# Patient Record
Sex: MALE | Race: BLACK OR AFRICAN AMERICAN | URBAN - METROPOLITAN AREA
[De-identification: names, ages, dates, MRNs, and addresses within clinical notes are randomized per-mention and may not be internally consistent; named-entity substitution may affect disease eponyms.]

---

## 2017-02-10 DIAGNOSIS — H69.93 DYSFUNCTION OF EUSTACHIAN TUBE, BILATERAL: Primary | ICD-10-CM

## 2017-02-10 RX ORDER — LORATADINE 10 MG/1
10 TABLET ORAL DAILY
Qty: 30 TABLET | Refills: 4 | Status: SHIPPED | OUTPATIENT
Start: 2017-02-10 | End: 2017-06-10

## 2017-02-16 DIAGNOSIS — J31.0 CHRONIC RHINITIS: ICD-10-CM

## 2017-02-16 RX ORDER — FLUTICASONE PROPIONATE 50 MCG
1-2 SPRAY, SUSPENSION (ML) NASAL DAILY
Qty: 16 G | Refills: 1 | Status: SHIPPED | OUTPATIENT
Start: 2017-02-16

## 2017-02-16 NOTE — TELEPHONE ENCOUNTER
Reason for Call:  Medication or medication refill:    Do you use a [facility]?  Name of the pharmacy and phone number for the current request:  [facility] [address] [place] - [phone] Fax [phone]    Name of the medication requested: Nasal spray    Other request: per pharmacist.    Can we leave a detailed message on this number? YES    Phone number patient can be reached at: Other phone number:  [phone]    Best Time: asap    Call taken on 2/16/2017 at 9:02 AM by Luanne Sharp

## 2017-02-16 NOTE — TELEPHONE ENCOUNTER
fluticasone (FLONASE) 50 MCG/ACT nasal spray      Last Written Prescription Date: 6/01/2016  Last Fill Quantity: 16g,  # refills: 1   Last Office Visit with [facility], [facility] or [facility] prescribing provider: 6/01/2016

## 2017-03-03 ENCOUNTER — MEDICAL CORRESPONDENCE (OUTPATIENT)
Dept: HEALTH INFORMATION MANAGEMENT | Facility: CLINIC | Age: 27
End: 2017-03-03

## 2017-05-18 ENCOUNTER — OFFICE VISIT (OUTPATIENT)
Dept: INTERNAL MEDICINE | Facility: CLINIC | Age: 27
End: 2017-05-18
Payer: MEDICARE

## 2017-05-18 VITALS
BODY MASS INDEX: 32.09 KG/M2 | WEIGHT: 192.6 LBS | OXYGEN SATURATION: 98 % | SYSTOLIC BLOOD PRESSURE: 128 MMHG | HEART RATE: 80 BPM | DIASTOLIC BLOOD PRESSURE: 78 MMHG | HEIGHT: 65 IN | TEMPERATURE: 98.4 F

## 2017-05-18 DIAGNOSIS — Z00.00 ENCOUNTER FOR ROUTINE ADULT HEALTH EXAMINATION WITHOUT ABNORMAL FINDINGS: ICD-10-CM

## 2017-05-18 DIAGNOSIS — Z13.6 CARDIOVASCULAR SCREENING; LDL GOAL LESS THAN 160: Primary | ICD-10-CM

## 2017-05-18 DIAGNOSIS — R73.9 ELEVATED BLOOD SUGAR: ICD-10-CM

## 2017-05-18 DIAGNOSIS — F33.0 MAJOR DEPRESSIVE DISORDER, RECURRENT EPISODE, MILD (H): ICD-10-CM

## 2017-05-18 DIAGNOSIS — F12.20 CANNABIS DEPENDENCE (H): ICD-10-CM

## 2017-05-18 PROCEDURE — 99395 PREV VISIT EST AGE 18-39: CPT | Performed by: INTERNAL MEDICINE

## 2017-05-18 NOTE — MR AVS SNAPSHOT
After Visit Summary   5/18/2017    Vitor Osman    MRN: [id number]           Patient Information     Date Of Birth          1990        Visit Information        Provider Department      5/18/2017 3:30 PM Lee Collins MD [facility]        Today's Diagnoses     CARDIOVASCULAR SCREENING; LDL GOAL LESS THAN 160    -  1    Encounter for routine adult health examination without abnormal findings        Elevated blood sugar        Major depressive disorder, recurrent episode, mild (H)        Cannabis dependence (H)          Care Instructions      Preventive Health Recommendations  Male Ages 26 - 39    Yearly exam:             See your health care provider every year in order to  o   Review health changes.   o   Discuss preventive care.    o   Review your medicines if your doctor has prescribed any.    You should be tested each year for STDs (sexually transmitted diseases), if you re at risk.     After age 35, talk to your provider about cholesterol testing. If you are at risk for heart disease, have your cholesterol tested at least every 5 years.     If you are at risk for diabetes, you should have a diabetes test (fasting glucose).  Shots: Get a flu shot each year. Get a tetanus shot every 10 years.     Nutrition:    Eat at least 5 servings of fruits and vegetables daily.     Eat whole-grain bread, whole-wheat pasta and brown rice instead of white grains and rice.     Talk to your provider about Calcium and Vitamin D.     Lifestyle    Exercise for at least 150 minutes a week (30 minutes a day, 5 days a week). This will help you control your weight and prevent disease.     Limit alcohol to one drink per day.     No smoking.     Wear sunscreen to prevent skin cancer.     See your dentist every six months for an exam and cleaning.             Follow-ups after your visit        Who to contact     If you have questions or need follow up information about today's clinic  "visit or your schedule please contact [facility] directly at [phone].  Normal or non-critical lab and imaging results will be communicated to you by MyChart, letter or phone within 4 business days after the clinic has received the results. If you do not hear from us within 7 days, please contact the clinic through MyChart or phone. If you have a critical or abnormal lab result, we will notify you by phone as soon as possible.  Submit refill requests through [employer] or call your pharmacy and they will forward the refill request to us. Please allow 3 business days for your refill to be completed.          Additional Information About Your Visit        MyChart Information     [employer] lets you send messages to your doctor, view your test results, renew your prescriptions, schedule appointments and more. To sign up, go to [email or web address] . Click on \"Log in\" on the left side of the screen, which will take you to the Welcome page. Then click on \"Sign up Now\" on the right side of the page.     You will be asked to enter the access code listed below, as well as some personal information. Please follow the directions to create your username and password.     Your access code is: [email or web address]  Expires: 2017 11:18 PM     Your access code will  in 90 days. If you need help or a new code, please call your [facility] clinic or [phone].        Care EveryWhere ID     This is your Care EveryWhere ID. This could be used by other organizations to access your [facility] medical records  [id number]        Your Vitals Were     Pulse Temperature Height Pulse Oximetry BMI (Body Mass Index)       80 98.4  F (36.9  C) (Oral) 5' 5\" (1.651 m) 98% 32.05 kg/m2        Blood Pressure from Last 3 Encounters:   17 122/78   17 142/89   17 128/78    Weight from Last 3 Encounters:   17 189 lb (85.7 kg)   17 192 lb (87.1 kg)   17 192 lb 9.6 oz (87.4 kg)               " Primary Care Provider Office Phone # Fax #    Lee Collins [phone]       [facility] [address] [facility] [address]        Thank you!     Thank you for choosing [facility]  for your care. Our goal is always to provide you with excellent care. Hearing back from our patients is one way we can continue to improve our services. Please take a few minutes to complete the written survey that you may receive in the mail after your visit with us. Thank you!             Your Updated Medication List - Protect others around you: Learn how to safely use, store and throw away your medicines at www.disposemymeds.org.          This list is accurate as of: 5/18/17 11:59 PM.  Always use your most recent med list.                   Brand Name Dispense Instructions for use    amphetamine-dextroamphetamine 10 MG per tablet    ADDERALL    60 tablet    Take 1 tablet (10 mg) by mouth 2 times daily DO NOT FILL BEFORE OCT 14       fluticasone 50 MCG/ACT spray    FLONASE    16 g    Spray 1-2 sprays into both nostrils daily       sertraline 50 MG tablet    ZOLOFT    90 tablet    Take 1 tablet (50 mg) by mouth daily       traZODone 50 MG tablet    DESYREL    30 tablet    Take 1 tablet (50 mg) by mouth nightly as needed for sleep

## 2017-05-18 NOTE — PROGRESS NOTES
SUBJECTIVE:     CC: Vitor Osman is an 26 year old male who presents for preventative health visit.     Healthy Habits:    Do you get at least three servings of calcium containing foods daily (dairy, green leafy vegetables, etc.)? yes    Amount of exercise or daily activities, outside of work: 7 day(s) per week    Problems taking medications regularly No    Medication side effects: No    Have you had an eye exam in the past two years? no    Do you see a dentist twice per year? no    Do you have sleep apnea, excessive snoring or daytime drowsiness?no            Today's PHQ-2 Score:   PHQ-2 ( 1999 Pf[employer]) 5/18/2017 6/1/2016   Q1: Little interest or pleasure in doing things 1 3   Q2: Feeling down, depressed or hopeless 1 3   PHQ-2 Score 2 6       Abuse: Current or Past(Physical, Sexual or Emotional)- No  Do you feel safe in your environment - Yes    Social History   Substance Use Topics     Smoking status: Current Every Day Smoker     Packs/day: 1.00     Last attempt to quit: 8/3/2013     Smokeless tobacco: Never Used     Alcohol use No     The patient does not drink >3 drinks per day nor >7 drinks per week.    Last PSA: No results found for: PSA    Recent Labs   Lab Test  05/03/14   1038   CHOL  182   HDL  34*   LDL  118   TRIG  151*   CHOLHDLRATIO  5.4*       Reviewed orders with patient. Reviewed health maintenance and updated orders accordingly - Yes    Reviewed and updated as needed this visit by clinical staff  Tobacco  Allergies  Med Hx  Surg Hx  Fam Hx  Soc Hx        Reviewed and updated as needed this visit by Provider            ROS:  C: NEGATIVE for fever, chills, change in weight  I: NEGATIVE for worrisome rashes, moles or lesions  E: NEGATIVE for vision changes or irritation  ENT: NEGATIVE for ear, mouth and throat problems  R: NEGATIVE for significant cough or SOB  CV: NEGATIVE for chest pain, palpitations or peripheral edema  GI: NEGATIVE for nausea, abdominal pain, heartburn, or change in  "bowel habits   male: negative for dysuria, hematuria, decreased urinary stream, erectile dysfunction, urethral discharge  M: NEGATIVE for significant arthralgias or myalgia  N: NEGATIVE for weakness, dizziness or paresthesias  P: NEGATIVE for changes in mood or affect      OBJECTIVE:     /78 (BP Location: Left arm, Patient Position: Chair, Cuff Size: Adult Regular)  Pulse 80  Temp 98.4  F (36.9  C) (Oral)  Ht 5' 5\" (1.651 m)  Wt 192 lb 9.6 oz (87.4 kg)  SpO2 98%  BMI 32.05 kg/m2  EXAM:  GENERAL: healthy, alert and no distress  NECK: no adenopathy, no asymmetry, masses, or scars and thyroid normal to palpation  RESP: lungs clear to auscultation - no rales, rhonchi or wheezes  CV: regular rate and rhythm, normal S1 S2, no S3 or S4, no murmur, click or rub, no peripheral edema and peripheral pulses strong  ABDOMEN: soft, nontender, no hepatosplenomegaly, no masses and bowel sounds normal  MS: no gross musculoskeletal defects noted, no edema    ASSESSMENT/PLAN:     1. Encounter for routine adult health examination without abnormal findings      2. CARDIOVASCULAR SCREENING; LDL GOAL LESS THAN 160    - Lipid panel reflex to direct LDL; Future    3. Elevated blood sugar    - Basic metabolic panel; Future  - Hemoglobin A1c; Future    4. Major depressive disorder, recurrent episode, mild (H)      5. Cannabis dependence (H)       COUNSELING:  Reviewed preventive health counseling, as reflected in patient instructions         reports that he has been smoking.  He has been smoking about 1.00 pack per day. He has never used smokeless tobacco.    Estimated body mass index is 32.05 kg/(m^2) as calculated from the following:    Height as of this encounter: 5' 5\" (1.651 m).    Weight as of this encounter: 192 lb 9.6 oz (87.4 kg).   Weight management plan: Discussed healthy diet and exercise guidelines and patient will follow up in 12 months in clinic to re-evaluate.    Counseling Resources:  ATP IV Guidelines  Pooled " Cohorts Equation Calculator  FRAX Risk Assessment  ICSI Preventive Guidelines  Dietary Guidelines for Americans, 2010  USDA's MyPlate  ASA Prophylaxis  Lung CA Screening    Lee Collins MD  [facility]

## 2017-05-19 ASSESSMENT — PATIENT HEALTH QUESTIONNAIRE - PHQ9: SUM OF ALL RESPONSES TO PHQ QUESTIONS 1-9: 10

## 2017-05-30 ENCOUNTER — APPOINTMENT (OUTPATIENT)
Dept: GENERAL RADIOLOGY | Facility: CLINIC | Age: 27
End: 2017-05-30
Attending: EMERGENCY MEDICINE
Payer: MEDICARE

## 2017-05-30 ENCOUNTER — HOSPITAL ENCOUNTER (EMERGENCY)
Facility: CLINIC | Age: 27
Discharge: HOME | End: 2017-05-30
Attending: EMERGENCY MEDICINE | Admitting: EMERGENCY MEDICINE
Payer: MEDICARE

## 2017-05-30 VITALS
SYSTOLIC BLOOD PRESSURE: 142 MMHG | TEMPERATURE: 98.2 F | BODY MASS INDEX: 31.99 KG/M2 | HEART RATE: 86 BPM | OXYGEN SATURATION: 97 % | RESPIRATION RATE: 16 BRPM | WEIGHT: 192 LBS | DIASTOLIC BLOOD PRESSURE: 89 MMHG | HEIGHT: 65 IN

## 2017-05-30 DIAGNOSIS — R73.9 HYPERGLYCEMIA: ICD-10-CM
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
## 2017-05-30 DIAGNOSIS — R07.9 ACUTE CHEST PAIN: ICD-10-CM

## 2017-05-30 LAB
ANION GAP SERPL CALCULATED.3IONS-SCNC: 8 MMOL/L (ref 3–14)
BASOPHILS # BLD AUTO: 0 10E9/L (ref 0–0.2)
BASOPHILS NFR BLD AUTO: 0.4 %
BUN SERPL-MCNC: 14 MG/DL (ref 7–30)
CALCIUM SERPL-MCNC: 9 MG/DL (ref 8.5–10.1)
CHLORIDE SERPL-SCNC: 101 MMOL/L (ref 94–109)
CO2 SERPL-SCNC: 26 MMOL/L (ref 20–32)
CREAT SERPL-MCNC: 0.76 MG/DL (ref 0.66–1.25)
DIFFERENTIAL METHOD BLD: ABNORMAL
EOSINOPHIL # BLD AUTO: 0.1 10E9/L (ref 0–0.7)
EOSINOPHIL NFR BLD AUTO: 0.8 %
ERYTHROCYTE [DISTWIDTH] IN BLOOD BY AUTOMATED COUNT: 13 % (ref 10–15)
GFR SERPL CREATININE-BSD FRML MDRD: ABNORMAL ML/MIN/1.7M2
GLUCOSE SERPL-MCNC: 267 MG/DL (ref 70–99)
HCT VFR BLD AUTO: 39.9 % (ref 40–53)
HGB BLD-MCNC: 14.2 G/DL (ref 13.3–17.7)
IMM GRANULOCYTES # BLD: 0 10E9/L (ref 0–0.4)
IMM GRANULOCYTES NFR BLD: 0.4 %
LYMPHOCYTES # BLD AUTO: 4.5 10E9/L (ref 0.8–5.3)
LYMPHOCYTES NFR BLD AUTO: 40.3 %
MCH RBC QN AUTO: 29.3 PG (ref 26.5–33)
MCHC RBC AUTO-ENTMCNC: 35.6 G/DL (ref 31.5–36.5)
MCV RBC AUTO: 82 FL (ref 78–100)
MONOCYTES # BLD AUTO: 0.7 10E9/L (ref 0–1.3)
MONOCYTES NFR BLD AUTO: 6.5 %
NEUTROPHILS # BLD AUTO: 5.7 10E9/L (ref 1.6–8.3)
NEUTROPHILS NFR BLD AUTO: 51.6 %
NRBC # BLD AUTO: 0 10*3/UL
NRBC BLD AUTO-RTO: 0 /100
PLATELET # BLD AUTO: 301 10E9/L (ref 150–450)
POTASSIUM SERPL-SCNC: 4.4 MMOL/L (ref 3.4–5.3)
RBC # BLD AUTO: 4.85 10E12/L (ref 4.4–5.9)
SODIUM SERPL-SCNC: 135 MMOL/L (ref 133–144)
TROPONIN I SERPL-MCNC: NORMAL UG/L (ref 0–0.04)
WBC # BLD AUTO: 11.1 10E9/L (ref 4–11)

## 2017-05-30 PROCEDURE — 84484 ASSAY OF TROPONIN QUANT: CPT | Performed by: EMERGENCY MEDICINE

## 2017-05-30 PROCEDURE — 80048 BASIC METABOLIC PNL TOTAL CA: CPT | Performed by: EMERGENCY MEDICINE

## 2017-05-30 PROCEDURE — 73030 X-RAY EXAM OF SHOULDER: CPT | Mod: LT

## 2017-05-30 PROCEDURE — 85025 COMPLETE CBC W/AUTO DIFF WBC: CPT | Performed by: EMERGENCY MEDICINE

## 2017-05-30 PROCEDURE — 99285 EMERGENCY DEPT VISIT HI MDM: CPT | Mod: 25

## 2017-05-30 PROCEDURE — 71020 XR CHEST 2 VW: CPT

## 2017-05-30 PROCEDURE — 93005 ELECTROCARDIOGRAM TRACING: CPT

## 2017-05-30 ASSESSMENT — ENCOUNTER SYMPTOMS
NAUSEA: 0
SHORTNESS OF BREATH: 1
VOMITING: 0
DIAPHORESIS: 0
BACK PAIN: 1

## 2017-05-30 NOTE — ED AVS SNAPSHOT
Emergency Department    [address] [facility] [address]    Phone:  [phone]    Fax:  [phone]                                       Vitor Osman   MRN: [id number]    Department:   Emergency Department   Date of Visit:  5/30/2017           Patient Information     Date Of Birth          1990        Your diagnoses for this visit were:     Acute chest pain     Fall, initial encounter     Hyperglycemia        You were seen by Jewel Clifton MD.      Follow-up Information     Follow up with Lee Collins MD. Call in 1 day.    Specialty:  Internal Medicine    Contact information:    [facility]  [address] [facility] [address] [phone]          Follow up with  Emergency Department.    Specialty:  EMERGENCY MEDICINE    Why:  As needed, If symptoms worsen    Contact information:    [address] [facility] [id number] [phone]      Discharge References/Attachments     CHEST PAIN, UNCERTAIN CAUSE (ENGLISH)    UPPER EXTREMITY CONTUSION (ENGLISH)      Future Appointments        Provider Department Dept Phone Center    6/8/2017 4:15 PM Lee Collins MD [facility] [phone]       24 Hour Appointment Hotline       To make an appointment at any [facility], call [phone] ([phone]). If you don't have a family doctor or clinic, we will help you find one. [facility] are conveniently located to serve the needs of you and your family.             Review of your medicines      Our records show that you are taking the medicines listed below. If these are incorrect, please call your family doctor or clinic.        Dose / Directions Last dose taken    amphetamine-dextroamphetamine 10 MG per tablet   Commonly known as:  ADDERALL   Dose:  10 mg   Quantity:  60 tablet        Take 1 tablet (10 mg) by mouth 2 times daily DO NOT FILL BEFORE OCT 14   Refills:  0        fluticasone 50  MCG/ACT spray   Commonly known as:  FLONASE   Dose:  1-2 spray   Quantity:  16 g        Spray 1-2 sprays into both nostrils daily   Refills:  1        loratadine 10 MG tablet   Commonly known as:  CLARITIN   Dose:  10 mg   Quantity:  30 tablet        Take 1 tablet (10 mg) by mouth daily   Refills:  4        sertraline 50 MG tablet   Commonly known as:  ZOLOFT   Dose:  50 mg   Quantity:  90 tablet        Take 1 tablet (50 mg) by mouth daily   Refills:  3        traZODone 50 MG tablet   Commonly known as:  DESYREL   Dose:  50 mg   Quantity:  30 tablet        Take 1 tablet (50 mg) by mouth nightly as needed for sleep   Refills:  1                Procedures and tests performed during your visit     Basic metabolic panel    CBC with platelets differential    EKG 12 lead    Shoulder XR, G/E 3 views, left    Troponin I    XR Chest 2 Views      Orders Needing Specimen Collection     None      Pending Results     No orders found from 5/28/2017 to 5/31/2017.            Pending Culture Results     No orders found from 5/28/2017 to 5/31/2017.            Pending Results Instructions     If you had any lab results that were not finalized at the time of your Discharge, you can call the ED Lab Result RN at [phone]. You will be contacted by this team for any positive Lab results or changes in treatment. The nurses are available 7 days a week from 10A to 6:30P.  You can leave a message 24 hours per day and they will return your call.        Test Results From Your Hospital Stay        5/30/2017 10:26 PM      Component Results     Component Value Ref Range & Units Status    WBC 11.1 (H) 4.0 - 11.0 10e9/L Final    RBC Count 4.85 4.4 - 5.9 10e12/L Final    Hemoglobin 14.2 13.3 - 17.7 g/dL Final    Hematocrit 39.9 (L) 40.0 - 53.0 % Final    MCV 82 78 - 100 fl Final    MCH 29.3 26.5 - 33.0 pg Final    MCHC 35.6 31.5 - 36.5 g/dL Final    RDW 13.0 10.0 - 15.0 % Final    Platelet Count 301 150 - 450 10e9/L Final    Diff Method Automated  Method  Final    % Neutrophils 51.6 % Final    % Lymphocytes 40.3 % Final    % Monocytes 6.5 % Final    % Eosinophils 0.8 % Final    % Basophils 0.4 % Final    % Immature Granulocytes 0.4 % Final    Nucleated RBCs 0 0 /100 Final    Absolute Neutrophil 5.7 1.6 - 8.3 10e9/L Final    Absolute Lymphocytes 4.5 0.8 - 5.3 10e9/L Final    Absolute Monocytes 0.7 0.0 - 1.3 10e9/L Final    Absolute Eosinophils 0.1 0.0 - 0.7 10e9/L Final    Absolute Basophils 0.0 0.0 - 0.2 10e9/L Final    Abs Immature Granulocytes 0.0 0 - 0.4 10e9/L Final    Absolute Nucleated RBC 0.0  Final         5/30/2017 10:55 PM      Component Results     Component Value Ref Range & Units Status    Sodium 135 133 - 144 mmol/L Final    Potassium 4.4 3.4 - 5.3 mmol/L Final    Chloride 101 94 - 109 mmol/L Final    Carbon Dioxide 26 20 - 32 mmol/L Final    Anion Gap 8 3 - 14 mmol/L Final    Glucose 267 (H) 70 - 99 mg/dL Final    Urea Nitrogen 14 7 - 30 mg/dL Final    Creatinine 0.76 0.66 - 1.25 mg/dL Final    GFR Estimate >90  Non  GFR Calc   >60 mL/min/1.7m2 Final    GFR Estimate If Black >90   GFR Calc   >60 mL/min/1.7m2 Final    Calcium 9.0 8.5 - 10.1 mg/dL Final         5/30/2017 10:55 PM      Component Results     Component Value Ref Range & Units Status    Troponin I ES  0.000 - 0.045 ug/L Final    <0.015  The 99th percentile for upper reference range is 0.045 ug/L.  Troponin values in   the range of 0.045 - 0.120 ug/L may be associated with risks of adverse   clinical events.           5/30/2017 10:35 PM      Narrative     CHEST TWO VIEWS   5/30/2017 10:23 PM    HISTORY: Chest pain.    COMPARISON: 12/11/2014    FINDINGS: The heart size is normal. No mediastinal pathology is seen.  The lungs are clear. The pulmonary vasculature is normal. No  pneumothorax or pleural effusion is seen. No fracture or other chest  wall pathology is seen. I see no definite change since the previous  examination.        Impression      IMPRESSION: Unremarkable chest.    SUSAN YE MD         5/30/2017 10:28 PM      Narrative     LEFT SHOULDER 3 VIEWS  5/30/2017 10:24 PM    HISTORY:  Pain after injury yesterday.    COMPARISON:  None.    FINDINGS:  No fracture or osseous lesion is seen. The joint spaces are  well preserved. No adjacent soft tissue pathology is seen.        Impression     IMPRESSION:  Unremarkable examination.    SUSAN YE MD                Clinical Quality Measure: Blood Pressure Screening     Your blood pressure was checked while you were in the emergency department today. The last reading we obtained was  BP: 142/89 . Please read the guidelines below about what these numbers mean and what you should do about them.  If your systolic blood pressure (the top number) is less than 120 and your diastolic blood pressure (the bottom number) is less than 80, then your blood pressure is normal. There is nothing more that you need to do about it.  If your systolic blood pressure (the top number) is 120-139 or your diastolic blood pressure (the bottom number) is 80-89, your blood pressure may be higher than it should be. You should have your blood pressure rechecked within a year by a primary care provider.  If your systolic blood pressure (the top number) is 140 or greater or your diastolic blood pressure (the bottom number) is 90 or greater, you may have high blood pressure. High blood pressure is treatable, but if left untreated over time it can put you at risk for heart attack, stroke, or kidney failure. You should have your blood pressure rechecked by a primary care provider within the next 4 weeks.  If your provider in the emergency department today gave you specific instructions to follow-up with your doctor or provider even sooner than that, you should follow that instruction and not wait for up to 4 weeks for your follow-up visit.        Thank you for choosing [facility]       Thank you for choosing [facility] for your  "care. Our goal is always to provide you with excellent care. Hearing back from our patients is one way we can continue to improve our services. Please take a few minutes to complete the written survey that you may receive in the mail after you visit with us. Thank you!        [employer] Information     [employer] lets you send messages to your doctor, view your test results, renew your prescriptions, schedule appointments and more. To sign up, go to [email or web address] . Click on \"Log in\" on the left side of the screen, which will take you to the Welcome page. Then click on \"Sign up Now\" on the right side of the page.     You will be asked to enter the access code listed below, as well as some personal information. Please follow the directions to create your username and password.     Your access code is: [email or web address]  Expires: 2017 11:18 PM     Your access code will  in 90 days. If you need help or a new code, please call your [facility] clinic or [phone].        Care EveryWhere ID     This is your Care EveryWhere ID. This could be used by other organizations to access your [facility] medical records  [id number]        After Visit Summary       This is your record. Keep this with you and show to your community pharmacist(s) and doctor(s) at your next visit.                  "

## 2017-05-30 NOTE — ED AVS SNAPSHOT
Emergency Department    [address] [place] [address]    Phone:  [phone]    Fax:  [phone]                                       Vitor Osman   MRN: [id number]    Department:   Emergency Department   Date of Visit:  5/30/2017           After Visit Summary Signature Page     I have received my discharge instructions, and my questions have been answered. I have discussed any challenges I see with this plan with the nurse or doctor.    ..........................................................................................................................................  Patient/Patient Representative Signature      ..........................................................................................................................................  Patient Representative Print Name and Relationship to Patient    ..................................................               ................................................  Date                                            Time    ..........................................................................................................................................  Reviewed by Signature/Title    ...................................................              ..............................................  Date                                                            Time

## 2017-05-31 NOTE — ED PROVIDER NOTES
"  History     Chief Complaint:  Chest Pain    HPI   History is limited due to being a poor historian, and supplemented by group home staff member at bedside.    Vitor Osman is a 26 year old male with a history of low back pain who presents with chest pain. The patient reports that he had \"a little\" left chest pain for multiple days before worsening of left shoulder and left sided chest pain after tripping and falling yesterday while carrying a big box. Since that time, he states that he has taken \"a lot\" of ibuprofen. However, because his pain continued and he developed mild shortness of breath, he presented to the ED for evaluation. While in the ED, he denies any other injury in the fall. He states that he last smoked marijuana \"a long time ago.\" He also denies nausea, vomiting, dizziness, or diaphoresis.  No prior heart of lung problems.      Allergies:  NKDA    Medications:    Flonase  Claritin   Adderall  Zoloft  Desyrel      Past Medical History:    ADHD  Cannabis dependence   Fetal alcohol syndrome  Mild major depression   Mild mental retardation   Bilateral low back pain without sciatica     Past Surgical History:    Unspecified surgery \"in the groin\" as a child    Family History:  History reviewed.  No significant family history.     Social History:  Relationship status: Single  The patient currently smokes 1 pack/day.   The patient does not drink alcohol.   The patient presents with a male , a member of his group home staff.     Review of Systems   Constitutional: Negative for diaphoresis.   Respiratory: Positive for shortness of breath.    Cardiovascular: Positive for chest pain.   Gastrointestinal: Negative for nausea and vomiting.   Musculoskeletal: Positive for back pain.        Positive for shoulder pain   All other systems reviewed and are negative.      Physical Exam   First Vitals:  BP: 142/89  Pulse: 86  Temp: 98.2  F (36.8  C)  Resp: 16  Height: 165.1 cm (5' 5\")  Weight: 87.1 kg (192 " lb)  SpO2: 97 %  RA    Physical Exam  General: nontoxic appearing male semi-recumbent, male group home staff at bedside  HENT: mucous membranes moist  CV: regular rate, regular rhythm, no lower extremity edema, no JVD, palpable symmetric radial pulses  Resp: clear throughout, normal effort, no crackles or wheezing  GI: abdomen soft and nontender, no guarding, negative Sosa's sign  MSK: mild L anterior tenderness to chest, also somewhat tender to L anterior shoulder with good ROM and no palpable acute deformity, remainder of chest wall, spine, and all extremities without acute tenderness  Skin: appropriately warm and dry, no erythema or vesicles to chest wall  Neuro: alert, clear speech, oriented   Psych: slightly flat affect, cooperative      Emergency Department Course     ECG @ 2140  Indication: Chest pain  Rate 84 bpm.   MD interval 146 ms.   QRS duration 84 ms.   QT/QTc 366/432 ms.   P-R-T axes -1.  Notes: Normal sinus rhythm. Possible left atrial enlargement. Possible septal infarct, age undetermined.    Time read 2147    Imaging:  Radiographic findings were communicated with the patient who voiced understanding of the findings.    Left Shoulder XR per radiology:   IMPRESSION:  Unremarkable examination.    Chest XR per radiology:   IMPRESSION: Unremarkable chest.      Laboratory:  CBC: WBC 11.1 (H) HGB 14.2 (WNL)  (WNL) HCT 39.9 (L)  BMP: Cr 0.76 (WNL) Glucose 267 (H)  Rest WNL  2200: Troponin I: <0.015 (WNL)    ED Course:  The patient arrived in triage where his vitals were measured and recorded. The patient was then escorted back to the emergency department.  Nursing notes and past medical history reviewed.   I performed a physical examination of the patient as documented above.  I explained the plan with the patient who consents to this.   Blood was drawn and sent to the laboratory for testing, see above results.   An ECG was obtained.   The patient underwent the above imaging studies.    The  patient was placed on continuous cardiac and pulse ox monitoring.    2247: Patient was rechecked.    I personally reviewed the laboratory and imaging results with the Patient and answered all related questions prior to discharge.   Findings and plan explained to the Patient and caregiver. Patient discharged home with instructions regarding supportive care, medications, and reasons to return. The importance of close follow-up was reviewed.     Impression & Plan      Medical Decision Making:  This 26 year old presents with concern for chest pain as well as left shoulder pain. He is a poor historian.  He states that he had a little chest pain that started well before his mechanical fall yesterday that caused worsening of his left chest pain and left shoulder pain here. His exam is benign. He is negative by PERC criteria, making PE extremely unlikely. There is no evidence of pneumothorax, significant rib fracture, or acute bony pathology to the left shoulder. Also considered medical causes such as arrhythmia, PNA, or ACS.  He seemed to have essentially no interest in his diagnosis, and on my second recheck, he asked for something stronger to use at home for the pain, stating he has not tried tylenol and is convinced it will not work.  I did not feel that opioids were indicated based on his current evaluation. Return precautions were reviewed and close outpatient follow up discussed. He was here with his group home staff, who was a part of these conversations as well.      Diagnosis:    ICD-10-CM    1. Acute chest pain R07.9    2. Fall, initial encounter W19.XXXA    3. Hyperglycemia R73.9        Disposition:   Discharge to home with primary care follow up.         I, Billie Aguayo, am serving as a scribe on 5/30/2017 at 9:49 PM to personally document services performed by Jewel Clifton MD, based on my observations and the provider's statements to me.       Jewel Clifton MD  05/31/17 0042

## 2017-06-08 ENCOUNTER — OFFICE VISIT (OUTPATIENT)
Dept: INTERNAL MEDICINE | Facility: CLINIC | Age: 27
End: 2017-06-08
Payer: MEDICARE

## 2017-06-08 VITALS
SYSTOLIC BLOOD PRESSURE: 122 MMHG | OXYGEN SATURATION: 95 % | BODY MASS INDEX: 31.45 KG/M2 | HEART RATE: 102 BPM | WEIGHT: 189 LBS | TEMPERATURE: 97.7 F | DIASTOLIC BLOOD PRESSURE: 78 MMHG

## 2017-06-08 DIAGNOSIS — M25.511 RIGHT SHOULDER PAIN, UNSPECIFIED CHRONICITY: Primary | ICD-10-CM

## 2017-06-08 PROCEDURE — 99213 OFFICE O/P EST LOW 20 MIN: CPT | Performed by: INTERNAL MEDICINE

## 2017-06-08 NOTE — NURSING NOTE
"Chief Complaint   Patient presents with     Shoulder Pain       Initial /78  Pulse 102  Temp 97.7  F (36.5  C) (Oral)  Wt 189 lb (85.7 kg)  SpO2 95%  BMI 31.45 kg/m2 Estimated body mass index is 31.45 kg/(m^2) as calculated from the following:    Height as of 5/30/17: 5' 5\" (1.651 m).    Weight as of this encounter: 189 lb (85.7 kg).  Medication Reconciliation: complete  "

## 2017-06-08 NOTE — MR AVS SNAPSHOT
"              After Visit Summary   2017    Vitor Osman    MRN: [id number]           Patient Information     Date Of Birth          1990        Visit Information        Provider Department      2017 4:15 PM Lee Collins MD [facility]        Today's Diagnoses     Right shoulder pain, unspecified chronicity    -  1       Follow-ups after your visit        Who to contact     If you have questions or need follow up information about today's clinic visit or your schedule please contact [facility] directly at [phone].  Normal or non-critical lab and imaging results will be communicated to you by [facility], letter or phone within 4 business days after the clinic has received the results. If you do not hear from us within 7 days, please contact the clinic through [facility] or phone. If you have a critical or abnormal lab result, we will notify you by phone as soon as possible.  Submit refill requests through [facility] or call your pharmacy and they will forward the refill request to us. Please allow 3 business days for your refill to be completed.          Additional Information About Your Visit        MyChart Information     [employer] lets you send messages to your doctor, view your test results, renew your prescriptions, schedule appointments and more. To sign up, go to [email or web address] . Click on \"Log in\" on the left side of the screen, which will take you to the Welcome page. Then click on \"Sign up Now\" on the right side of the page.     You will be asked to enter the access code listed below, as well as some personal information. Please follow the directions to create your username and password.     Your access code is: [email or web address]  Expires: 2017 11:18 PM     Your access code will  in 90 days. If you need help or a new code, please call your [facility] or [phone].        Care EveryWhere ID     This is your Care " EveryWhere ID. This could be used by other organizations to access your [facility] medical records  [id number]        Your Vitals Were     Pulse Temperature Pulse Oximetry BMI (Body Mass Index)          102 97.7  F (36.5  C) (Oral) 95% 31.45 kg/m2         Blood Pressure from Last 3 Encounters:   06/08/17 122/78   05/30/17 142/89   05/18/17 128/78    Weight from Last 3 Encounters:   06/08/17 189 lb (85.7 kg)   05/30/17 192 lb (87.1 kg)   05/18/17 192 lb 9.6 oz (87.4 kg)              Today, you had the following     No orders found for display       Primary Care Provider Office Phone # Fax #    Lee Collins [phone]       [facility] ([facility] [address] [address]        Equal Access to Services     NATALIYA GUERRERO : Hadii aad ku hadasho Soomaali, waaxda luqadaha, qaybta kaalmada adeegyada, waxay ousmanein hayaan lele adkins . So [facility] [phone].    ATENCIÓN: Si habla español, tiene a rodney disposición servicios gratuitos de asistencia lingüística. Llame al [phone].    We comply with applicable federal civil rights laws and [place] laws. We do not discriminate on the basis of race, color, national origin, age, disability sex, sexual orientation or gender identity.            Thank you!     Thank you for choosing [facility]  for your care. Our goal is always to provide you with excellent care. Hearing back from our patients is one way we can continue to improve our services. Please take a few minutes to complete the written survey that you may receive in the mail after your visit with us. Thank you!             Your Updated Medication List - Protect others around you: Learn how to safely use, store and throw away your medicines at www.disposemymeds.org.          This list is accurate as of: 6/8/17 11:59 PM.  Always use your most recent med list.                   Brand Name Dispense Instructions for use Diagnosis     amphetamine-dextroamphetamine 10 MG per tablet    ADDERALL    60 tablet    Take 1 tablet (10 mg) by mouth 2 times daily DO NOT FILL BEFORE OCT 14    Attention deficit hyperactivity disorder (ADHD), unspecified ADHD type       fluticasone 50 MCG/ACT spray    FLONASE    16 g    Spray 1-2 sprays into both nostrils daily    Chronic rhinitis       sertraline 50 MG tablet    ZOLOFT    90 tablet    Take 1 tablet (50 mg) by mouth daily        traZODone 50 MG tablet    DESYREL    30 tablet    Take 1 tablet (50 mg) by mouth nightly as needed for sleep

## 2017-06-08 NOTE — PROGRESS NOTES
SUBJECTIVE:                                                    Vitor Osman is a 26 year old male who presents to clinic today for the following health issues:      Joint Pain     Onset: 2 months    Description:   Location: left shoulder  Character: Sharp, Stabbing and Gnawing    Intensity: moderate    Progression of Symptoms: intermittent    Accompanying Signs & Symptoms:  Other symptoms: none   History:   Previous similar pain: no       Precipitating factors:   Trauma or overuse: no     Alleviating factors:  Improved by: nothing       Therapies Tried and outcome: Ice, heat, immobilization, ibuprofen nothing helps          Problem list and histories reviewed & adjusted, as indicated.  Additional history:         Reviewed and updated as needed this visit by clinical staff  Tobacco  Allergies       Reviewed and updated as needed this visit by Provider         ROS:  Constitutional, HEENT, cardiovascular, pulmonary, gi and gu systems are negative, except as otherwise noted.    OBJECTIVE:                                                    /78  Pulse 102  Temp 97.7  F (36.5  C) (Oral)  Wt 189 lb (85.7 kg)  SpO2 95%  BMI 31.45 kg/m2  Body mass index is 31.45 kg/(m^2).  GENERAL: healthy, alert and no distress  NECK: no adenopathy, no asymmetry, masses, or scars and thyroid normal to palpation  RESP: lungs clear to auscultation - no rales, rhonchi or wheezes  CV: regular rate and rhythm, normal S1 S2, no S3 or S4, no murmur, click or rub, no peripheral edema and peripheral pulses strong  ABDOMEN: soft, nontender, no hepatosplenomegaly, no masses and bowel sounds normal  MS: no gross musculoskeletal defects noted, no edema         ASSESSMENT/PLAN:                                                      (M25.511) Right shoulder pain, unspecified chronicity  (primary encounter diagnosis)  Comment: Likely rotator cuff tendonitis.  Discussed conservative measures  Plan:          Lee Collins MD  [place]  [facility]

## 2017-06-10 DIAGNOSIS — H69.93 DYSFUNCTION OF EUSTACHIAN TUBE, BILATERAL: ICD-10-CM

## 2017-06-10 NOTE — TELEPHONE ENCOUNTER
loratadine (CLARITIN) 10 MG tablet      Last Written Prescription Date: 02/10/17  Last Fill Quantity: 30 tab,  # refills: 4   Last Office Visit with [facility], [facility] or [facility] prescribing provider: 06/08/17

## 2017-06-13 RX ORDER — LORATADINE 10 MG/1
10 TABLET ORAL DAILY
Qty: 30 TABLET | Refills: 11 | Status: SHIPPED | OUTPATIENT
Start: 2017-06-13
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
## 2018-03-28 ENCOUNTER — MEDICAL CORRESPONDENCE (OUTPATIENT)
Dept: HEALTH INFORMATION MANAGEMENT | Facility: CLINIC | Age: 28
End: 2018-03-28

## 2018-03-28 ENCOUNTER — OFFICE VISIT (OUTPATIENT)
Dept: FAMILY MEDICINE | Facility: OTHER | Age: 28
End: 2018-03-28
Payer: MEDICARE

## 2018-03-28 VITALS
HEIGHT: 65 IN | BODY MASS INDEX: 30.35 KG/M2 | WEIGHT: 182.2 LBS | RESPIRATION RATE: 16 BRPM | DIASTOLIC BLOOD PRESSURE: 74 MMHG | HEART RATE: 76 BPM | SYSTOLIC BLOOD PRESSURE: 104 MMHG

## 2018-03-28 DIAGNOSIS — F90.2 ATTENTION DEFICIT HYPERACTIVITY DISORDER (ADHD), COMBINED TYPE: ICD-10-CM

## 2018-03-28 DIAGNOSIS — F12.91 HISTORY OF MARIJUANA USE: ICD-10-CM

## 2018-03-28 DIAGNOSIS — F70 MILD MENTAL RETARDATION: ICD-10-CM

## 2018-03-28 DIAGNOSIS — F33.0 MAJOR DEPRESSIVE DISORDER, RECURRENT EPISODE, MILD (H): ICD-10-CM

## 2018-03-28 DIAGNOSIS — Z00.00 ROUTINE GENERAL MEDICAL EXAMINATION AT A HEALTH CARE FACILITY: Primary | ICD-10-CM

## 2018-03-28 DIAGNOSIS — E78.5 HYPERLIPIDEMIA LDL GOAL <130: ICD-10-CM

## 2018-03-28 DIAGNOSIS — Q86.0 FAS (FETAL ALCOHOL SYNDROME): ICD-10-CM

## 2018-03-28 DIAGNOSIS — F17.200 TOBACCO USE DISORDER: ICD-10-CM

## 2018-03-28 PROCEDURE — 99395 PREV VISIT EST AGE 18-39: CPT | Performed by: PHYSICIAN ASSISTANT

## 2018-03-28 RX ORDER — PROPRANOLOL HYDROCHLORIDE 20 MG/1
20 TABLET ORAL 2 TIMES DAILY
Refills: 1 | COMMUNITY
Start: 2017-05-30

## 2018-03-28 ASSESSMENT — ANXIETY QUESTIONNAIRES
GAD7 TOTAL SCORE: 0
3. WORRYING TOO MUCH ABOUT DIFFERENT THINGS: NOT AT ALL
6. BECOMING EASILY ANNOYED OR IRRITABLE: NOT AT ALL
GAD7 TOTAL SCORE: 0
2. NOT BEING ABLE TO STOP OR CONTROL WORRYING: NOT AT ALL
GAD7 TOTAL SCORE: 0
7. FEELING AFRAID AS IF SOMETHING AWFUL MIGHT HAPPEN: NOT AT ALL
5. BEING SO RESTLESS THAT IT IS HARD TO SIT STILL: NOT AT ALL
4. TROUBLE RELAXING: NOT AT ALL
7. FEELING AFRAID AS IF SOMETHING AWFUL MIGHT HAPPEN: NOT AT ALL
1. FEELING NERVOUS, ANXIOUS, OR ON EDGE: NOT AT ALL

## 2018-03-28 ASSESSMENT — PATIENT HEALTH QUESTIONNAIRE - PHQ9
SUM OF ALL RESPONSES TO PHQ QUESTIONS 1-9: 6
10. IF YOU CHECKED OFF ANY PROBLEMS, HOW DIFFICULT HAVE THESE PROBLEMS MADE IT FOR YOU TO DO YOUR WORK, TAKE CARE OF THINGS AT HOME, OR GET ALONG WITH OTHER PEOPLE: NOT DIFFICULT AT ALL
SUM OF ALL RESPONSES TO PHQ QUESTIONS 1-9: 6

## 2018-03-28 ASSESSMENT — PAIN SCALES - GENERAL: PAINLEVEL: NO PAIN (0)

## 2018-03-28 NOTE — NURSING NOTE
"Chief Complaint   Patient presents with     Physical     Panel Management     CHRISTIANNE, deepa, smoking cessation, PHQ9       Initial /74 (Cuff Size: Adult Large)  Pulse 76  Resp 16  Ht 5' 4.88\" (1.648 m)  Wt 182 lb 3.2 oz (82.6 kg)  BMI 30.43 kg/m2 Estimated body mass index is 30.43 kg/(m^2) as calculated from the following:    Height as of this encounter: 5' 4.88\" (1.648 m).    Weight as of this encounter: 182 lb 3.2 oz (82.6 kg).  Medication Reconciliation: complete  "

## 2018-03-28 NOTE — MR AVS SNAPSHOT
After Visit Summary   3/28/2018    Vitor Osman    MRN: [id number]           Patient Information     Date Of Birth          1990        Visit Information        Provider Department      3/28/2018 3:00 PM Josué Turner PA-C [facility]'s Diagnoses     Routine general medical examination at a health care facility    -  1    Tobacco use disorder        FAS (fetal alcohol syndrome)        Mild mental retardation        Major depressive disorder, recurrent episode, mild (H)        Attention deficit hyperactivity disorder (ADHD), combined type        History of marijuana use        Hyperlipidemia LDL goal <130          Care Instructions      Preventive Health Recommendations  Male Ages 26 - 39    Yearly exam:             See your health care provider every year in order to  o   Review health changes.   o   Discuss preventive care.    o   Review your medicines if your doctor has prescribed any.    You should be tested each year for STDs (sexually transmitted diseases), if you re at risk.     After age 35, talk to your provider about cholesterol testing. If you are at risk for heart disease, have your cholesterol tested at least every 5 years.     If you are at risk for diabetes, you should have a diabetes test (fasting glucose).  Shots: Get a flu shot each year. Get a tetanus shot every 10 years.     Nutrition:    Eat at least 5 servings of fruits and vegetables daily.     Eat whole-grain bread, whole-wheat pasta and brown rice instead of white grains and rice.     Talk to your provider about Calcium and Vitamin D.     Lifestyle    Exercise for at least 150 minutes a week (30 minutes a day, 5 days a week). This will help you control your weight and prevent disease.     Limit alcohol to one drink per day.     No smoking.     Wear sunscreen to prevent skin cancer.     See your dentist every six months for an exam and cleaning.             Follow-ups after your visit       "  Future tests that were ordered for you today     Open Future Orders        Priority Expected Expires Ordered    TOBACCO CESSATION ORDER FOR HM Routine  2018 3/28/2018    TB INTRADERMAL TEST Routine  2018 3/28/2018    CBC with platelets Routine  2018 3/28/2018    Comprehensive metabolic panel Routine  2018 3/28/2018    Lipid panel reflex to direct LDL Fasting Routine  2018 3/28/2018            Who to contact     If you have questions or need follow up information about today's clinic visit or your schedule please contact [facility] directly at [phone].  Normal or non-critical lab and imaging results will be communicated to you by MediaSharehart, letter or phone within 4 business days after the clinic has received the results. If you do not hear from us within 7 days, please contact the clinic through MediaSharehart or phone. If you have a critical or abnormal lab result, we will notify you by phone as soon as possible.  Submit refill requests through Skweez or call your pharmacy and they will forward the refill request to us. Please allow 3 business days for your refill to be completed.          Additional Information About Your Visit        MediaShareharItandi Information     Skweez lets you send messages to your doctor, view your test results, renew your prescriptions, schedule appointments and more. To sign up, go to [email or web address] . Click on \"Log in\" on the left side of the screen, which will take you to the Welcome page. Then click on \"Sign up Now\" on the right side of the page.     You will be asked to enter the access code listed below, as well as some personal information. Please follow the directions to create your username and password.     Your access code is: [email or web address]  Expires: 6/10/2018  9:17 AM     Your access code will  in 90 days. If you need help or a new code, please call your [place] clinic or [phone].        Care EveryWhere ID     This is your Care " "EveryWhere ID. This could be used by other organizations to access your [place] medical records  [id number]        Your Vitals Were     Pulse Respirations Height BMI (Body Mass Index)          76 16 5' 4.88\" (1.648 m) 30.43 kg/m2         Blood Pressure from Last 3 Encounters:   03/28/18 104/74   06/08/17 122/78   05/30/17 142/89    Weight from Last 3 Encounters:   03/28/18 182 lb 3.2 oz (82.6 kg)   06/08/17 189 lb (85.7 kg)   05/30/17 192 lb (87.1 kg)                 Today's Medication Changes          These changes are accurate as of 3/28/18  3:36 PM.  If you have any questions, ask your nurse or doctor.               Stop taking these medicines if you haven't already. Please contact your care team if you have questions.     amphetamine-dextroamphetamine 10 MG per tablet   Commonly known as:  ADDERALL   Stopped by:  Josué Turner PA-C                    Primary Care Provider Office Phone # Fax #    Lee Collins [phone]       [address] [place] [address]        Equal Access to Services     TASHA GUERRERO AH: Hadii eve ku hadasho Sojenniferali, waaxda luqadaha, qaybta kaalmada adeegyada, verónica smith. So [facility] [phone].    ATENCIÓN: Si habla español, tiene a rodney disposición servicios gratuitos de asistencia lingüística. [facility] [phone].    We comply with applicable federal civil rights laws and [place] laws. We do not discriminate on the basis of race, color, national origin, age, disability, sex, sexual orientation, or gender identity.            Thank you!     Thank you for choosing [facility]  for your care. Our goal is always to provide you with excellent care. Hearing back from our patients is one way we can continue to improve our services. Please take a few minutes to complete the written survey that you may receive in the mail after your visit with us. Thank you!             Your Updated Medication List - Protect others " around you: Learn how to safely use, store and throw away your medicines at www.disposemymeds.org.          This list is accurate as of 3/28/18  3:36 PM.  Always use your most recent med list.                   Brand Name Dispense Instructions for use Diagnosis    fluticasone 50 MCG/ACT spray    FLONASE    16 g    Spray 1-2 sprays into both nostrils daily    Chronic rhinitis       loratadine 10 MG tablet    CLARITIN    30 tablet    Take 1 tablet (10 mg) by mouth daily    Dysfunction of Eustachian tube, bilateral       propranolol 20 MG tablet    INDERAL     Take 20 mg by mouth daily        sertraline 50 MG tablet    ZOLOFT    90 tablet    Take 100 mg by mouth daily        traZODone 50 MG tablet    DESYREL    30 tablet    Take 1 tablet (50 mg) by mouth nightly as needed for sleep

## 2018-03-29 ASSESSMENT — PATIENT HEALTH QUESTIONNAIRE - PHQ9: SUM OF ALL RESPONSES TO PHQ QUESTIONS 1-9: 6

## 2018-03-29 ASSESSMENT — ANXIETY QUESTIONNAIRES: GAD7 TOTAL SCORE: 0

## 2018-04-03 ENCOUNTER — ALLIED HEALTH/NURSE VISIT (OUTPATIENT)
Dept: FAMILY MEDICINE | Facility: OTHER | Age: 28
End: 2018-04-03
Payer: MEDICARE

## 2018-04-03 DIAGNOSIS — E78.5 HYPERLIPIDEMIA LDL GOAL <130: ICD-10-CM

## 2018-04-03 DIAGNOSIS — Z11.1 SCREENING EXAMINATION FOR PULMONARY TUBERCULOSIS: Primary | ICD-10-CM

## 2018-04-03 DIAGNOSIS — Z00.00 ROUTINE GENERAL MEDICAL EXAMINATION AT A HEALTH CARE FACILITY: ICD-10-CM

## 2018-04-03 LAB
ALBUMIN SERPL-MCNC: 4 G/DL (ref 3.4–5)
ALP SERPL-CCNC: 135 U/L (ref 40–150)
ALT SERPL W P-5'-P-CCNC: 26 U/L (ref 0–70)
ANION GAP SERPL CALCULATED.3IONS-SCNC: 9 MMOL/L (ref 3–14)
AST SERPL W P-5'-P-CCNC: 15 U/L (ref 0–45)
BILIRUB SERPL-MCNC: 0.4 MG/DL (ref 0.2–1.3)
BUN SERPL-MCNC: 9 MG/DL (ref 7–30)
CALCIUM SERPL-MCNC: 9 MG/DL (ref 8.5–10.1)
CHLORIDE SERPL-SCNC: 99 MMOL/L (ref 94–109)
CHOLEST SERPL-MCNC: 202 MG/DL
CO2 SERPL-SCNC: 29 MMOL/L (ref 20–32)
CREAT SERPL-MCNC: 0.81 MG/DL (ref 0.66–1.25)
ERYTHROCYTE [DISTWIDTH] IN BLOOD BY AUTOMATED COUNT: 13.4 % (ref 10–15)
GFR SERPL CREATININE-BSD FRML MDRD: >90 ML/MIN/1.7M2
GLUCOSE SERPL-MCNC: 281 MG/DL (ref 70–99)
HCT VFR BLD AUTO: 41.5 % (ref 40–53)
HDLC SERPL-MCNC: 33 MG/DL
HGB BLD-MCNC: 14.3 G/DL (ref 13.3–17.7)
LDLC SERPL CALC-MCNC: 117 MG/DL
MCH RBC QN AUTO: 28 PG (ref 26.5–33)
MCHC RBC AUTO-ENTMCNC: 34.5 G/DL (ref 31.5–36.5)
MCV RBC AUTO: 81 FL (ref 78–100)
NONHDLC SERPL-MCNC: 169 MG/DL
PLATELET # BLD AUTO: 303 10E9/L (ref 150–450)
POTASSIUM SERPL-SCNC: 4.4 MMOL/L (ref 3.4–5.3)
PROT SERPL-MCNC: 8.2 G/DL (ref 6.8–8.8)
RBC # BLD AUTO: 5.11 10E12/L (ref 4.4–5.9)
SODIUM SERPL-SCNC: 137 MMOL/L (ref 133–144)
TRIGL SERPL-MCNC: 260 MG/DL
WBC # BLD AUTO: 11 10E9/L (ref 4–11)

## 2018-04-03 PROCEDURE — 86580 TB INTRADERMAL TEST: CPT

## 2018-04-03 PROCEDURE — 99207 ZZC NO CHARGE LOS: CPT

## 2018-04-03 PROCEDURE — 84443 ASSAY THYROID STIM HORMONE: CPT | Performed by: PHYSICIAN ASSISTANT

## 2018-04-03 PROCEDURE — 80053 COMPREHEN METABOLIC PANEL: CPT | Performed by: PHYSICIAN ASSISTANT

## 2018-04-03 PROCEDURE — 85027 COMPLETE CBC AUTOMATED: CPT | Performed by: PHYSICIAN ASSISTANT

## 2018-04-03 PROCEDURE — 80061 LIPID PANEL: CPT | Performed by: PHYSICIAN ASSISTANT

## 2018-04-03 PROCEDURE — 36415 COLL VENOUS BLD VENIPUNCTURE: CPT | Performed by: PHYSICIAN ASSISTANT

## 2018-04-03 NOTE — NURSING NOTE
"Chief Complaint   Patient presents with     Imm/Inj     mantoux       Initial There were no vitals taken for this visit. Estimated body mass index is 30.43 kg/(m^2) as calculated from the following:    Height as of 3/28/18: 5' 4.88\" (1.648 m).    Weight as of 3/28/18: 182 lb 3.2 oz (82.6 kg).  Medication Reconciliation: complete     The patient is asked the following questions today and these are his answers:    -Have you had a mantoux administered in the past 30 days?    No  -Have you had a previous positive Mantoux.  No  -Have you received BCG in the past.  No  -Have you had a live vaccine  (MMR, Varicella, OPV, Yellow Fever) in the last 6 weeks.  No  -Have you had and active  viral or bacterial infection in the past 6 weeks.  No  -Have you received corticosteroids or immunosuppressive agents in the past 6 weeks.  No  -Have you been diagnosed with HIV?  No  -Do you have a maglinancy?  No  "

## 2018-04-03 NOTE — MR AVS SNAPSHOT
After Visit Summary   4/3/2018    Vitor Osman    MRN: [id number]           Patient Information     Date Of Birth          1990        Visit Information        Provider Department      4/3/2018 2:00 PM NL FLOAT NURSE [facility]        Today's Diagnoses     Screening examination for pulmonary tuberculosis    -  1       Follow-ups after your visit        Your next 10 appointments already scheduled     Apr 03, 2018  1:30 PM CDT   LAB with NL LAB [facility]    [address] [facility] [address]   [phone]           Please do not eat 10-12 hours before your appointment if you are coming in fasting for labs on lipids, cholesterol, or glucose (sugar). This does not apply to pregnant women. Water, hot tea and black coffee (with nothing added) are okay. Do not drink other fluids, diet soda or chew gum.            Apr 03, 2018  2:00 PM CDT   Nurse Only with NL MISA NURSE [facility]    [address] [facility] [address]   [phone]            Apr 05, 2018  1:30 PM CDT   Nurse Only with NL RN [facility]    [address] [facility] [address]   [phone]              Who to contact     If you have questions or need follow up information about today's clinic visit or your schedule please contact [facility] directly at [phone].  Normal or non-critical lab and imaging results will be communicated to you by MyChart, letter or phone within 4 business days after the clinic has received the results. If you do not hear from us within 7 days, please contact the clinic through Weavedhart or phone. If you have a critical or abnormal lab result, we will notify you by phone as soon as possible.  Submit refill requests through  or call your pharmacy and they will forward the refill  "request to us. Please allow 3 business days for your refill to be completed.          Additional Information About Your Visit        MyChart Information     "[employer] lets you send messages to your doctor, view your test results, renew your prescriptions, schedule appointments and more. To sign up, go to [email or web address] . Click on \"Log in\" on the left side of the screen, which will take you to the Welcome page. Then click on \"Sign up Now\" on the right side of the page.     You will be asked to enter the access code listed below, as well as some personal information. Please follow the directions to create your username and password.     Your access code is: [email or web address]  Expires: 6/10/2018  9:17 AM     Your access code will  in 90 days. If you need help or a new code, please call your [place] clinic or [phone].        Care EveryWhere ID     This is your Care EveryWhere ID. This could be used by other organizations to access your [place] medical records  [id number]         Blood Pressure from Last 3 Encounters:   18 104/74   17 122/78   17 142/89    Weight from Last 3 Encounters:   18 182 lb 3.2 oz (82.6 kg)   17 189 lb (85.7 kg)   17 192 lb (87.1 kg)              We Performed the Following     INJECTION INTRAMUSCULAR OR SUB-Q     TB INTRADERMAL TEST        Primary Care Provider Office Phone # Fax #    Lee Collins [phone]       [address] [place] [address]        Equal Access to Services     [facility] HospitalANDRES : Hadii eve diego hadasho Soroshni, waaxda luqadaha, qaybta kaalmada yvan, verónica smith. So [facility] [phone].    ATENCIÓN: Si habla español, tiene a rodney disposición servicios gratuitos de asistencia lingüística. Llame al [phone].    We comply with applicable federal civil rights laws and [place] laws. We do not discriminate on the basis of race, color, national origin, age, disability, " sex, sexual orientation, or gender identity.            Thank you!     Thank you for choosing [facility]  for your care. Our goal is always to provide you with excellent care. Hearing back from our patients is one way we can continue to improve our services. Please take a few minutes to complete the written survey that you may receive in the mail after your visit with us. Thank you!             Your Updated Medication List - Protect others around you: Learn how to safely use, store and throw away your medicines at www.disposemymeds.org.          This list is accurate as of 4/3/18  1:10 PM.  Always use your most recent med list.                   Brand Name Dispense Instructions for use Diagnosis    fluticasone 50 MCG/ACT spray    FLONASE    16 g    Spray 1-2 sprays into both nostrils daily    Chronic rhinitis       loratadine 10 MG tablet    CLARITIN    30 tablet    Take 1 tablet (10 mg) by mouth daily    Dysfunction of Eustachian tube, bilateral       propranolol 20 MG tablet    INDERAL     Take 20 mg by mouth daily        sertraline 50 MG tablet    ZOLOFT    90 tablet    Take 100 mg by mouth daily        traZODone 50 MG tablet    DESYREL    30 tablet    Take 1 tablet (50 mg) by mouth nightly as needed for sleep

## 2018-04-04 ENCOUNTER — TELEPHONE (OUTPATIENT)
Dept: FAMILY MEDICINE | Facility: OTHER | Age: 28
End: 2018-04-04

## 2018-04-04 DIAGNOSIS — E11.9 DIABETES MELLITUS WITHOUT COMPLICATION (H): Primary | ICD-10-CM

## 2018-04-04 DIAGNOSIS — E78.5 HYPERLIPIDEMIA LDL GOAL <100: ICD-10-CM

## 2018-04-04 LAB — TSH SERPL DL<=0.005 MIU/L-ACNC: 1.36 MU/L (ref 0.4–4)

## 2018-04-04 RX ORDER — GLYBURIDE-METFORMIN HYDROCHLORIDE 1.25; 25 MG/1; MG/1
1 TABLET ORAL 2 TIMES DAILY WITH MEALS
Qty: 60 TABLET | Refills: 0 | Status: SHIPPED | OUTPATIENT
Start: 2018-04-04 | End: 2018-06-04

## 2018-04-04 RX ORDER — GLYBURIDE-METFORMIN HYDROCHLORIDE 1.25; 25 MG/1; MG/1
1 TABLET ORAL 2 TIMES DAILY WITH MEALS
Qty: 180 TABLET | Refills: 1 | Status: SHIPPED | OUTPATIENT
Start: 2018-04-04 | End: 2018-04-04

## 2018-04-04 RX ORDER — ATORVASTATIN CALCIUM 10 MG/1
10 TABLET, FILM COATED ORAL DAILY
Qty: 90 TABLET | Refills: 1 | Status: SHIPPED | OUTPATIENT
Start: 2018-04-04 | End: 2018-04-04

## 2018-04-04 RX ORDER — ATORVASTATIN CALCIUM 10 MG/1
10 TABLET, FILM COATED ORAL DAILY
Qty: 30 TABLET | Refills: 0 | Status: SHIPPED | OUTPATIENT
Start: 2018-04-04

## 2018-04-04 NOTE — TELEPHONE ENCOUNTER
Shashank from [facility] called and states that we should have called in a glucometer for this patient to the general pharmacy.  I asked for the pharmacy phone # and he did not have it. He states that is should be on file for this patient.  He asked that we check into this order and call him back on his secured line given.

## 2018-04-04 NOTE — TELEPHONE ENCOUNTER
Please note that there is a DME order that was sent to the general pharmacy of record for a glucose monitor and related testing supplies.  Please contact the collar and advised that this is already been done.  Electronically signed:    Josué Vivas PA-C

## 2018-04-04 NOTE — TELEPHONE ENCOUNTER
Patient has diabetes that seems to have been present for some time.  Needs to start meds, see diabetic education ASAP and ROV 3 months.  Electronically signed:    Josué Vivas PA-C

## 2018-04-05 ENCOUNTER — ALLIED HEALTH/NURSE VISIT (OUTPATIENT)
Dept: FAMILY MEDICINE | Facility: OTHER | Age: 28
End: 2018-04-05
Payer: MEDICARE

## 2018-04-05 ENCOUNTER — TELEPHONE (OUTPATIENT)
Dept: FAMILY MEDICINE | Facility: OTHER | Age: 28
End: 2018-04-05

## 2018-04-05 DIAGNOSIS — Z11.1 SCREENING EXAMINATION FOR PULMONARY TUBERCULOSIS: Primary | ICD-10-CM

## 2018-04-05 DIAGNOSIS — E11.9 DIABETES MELLITUS WITHOUT COMPLICATION (H): Primary | ICD-10-CM

## 2018-04-05 LAB
PPDINDURATION: 0 MM (ref 0–5)
PPDREDNESS: 0 MM

## 2018-04-05 PROCEDURE — 99207 ZZC NO CHARGE NURSE ONLY: CPT

## 2018-04-05 RX ORDER — GLUCOSAMINE HCL/CHONDROITIN SU 500-400 MG
CAPSULE ORAL
Qty: 100 EACH | Refills: 0 | Status: SHIPPED | OUTPATIENT
Start: 2018-04-05

## 2018-04-05 RX ORDER — LANCETS
EACH MISCELLANEOUS
Qty: 100 EACH | Refills: 3 | Status: SHIPPED | OUTPATIENT
Start: 2018-04-05

## 2018-04-05 NOTE — TELEPHONE ENCOUNTER
Spoke with [facility].  Discussed importance of diabetic education.  He agreed and will work with patient to get this addressed.    Testing supplies sent.    Andrea Meyers, RN, BSN

## 2018-04-05 NOTE — TELEPHONE ENCOUNTER
Reason for Call:  Other prescription    Detailed comments: patient needs his prescription for   order for DME 1 Device 0 4/4/2018  --   Sig: Equipment being ordered: glucose monitor and related supplies for testing blood sugar daily     Sent to [facility] in [place]. Patient's insurance will not cover this at the [facility].     Phone Number Patient can be reached at: Home number on file [phone] (home) or Cell number on file:    No relevant phone numbers on file.       Best Time: anytime    Can we leave a detailed message on this number? Not Applicable    Call taken on 4/5/2018 at 3:04 PM by Joann Horton

## 2018-04-05 NOTE — TELEPHONE ENCOUNTER
Diabetes Education Scheduling Outreach #1:    Call to patient to schedule. [facility] home staff declined to schedule. Will call black op schedule        Alina Malin  [facility]  Diabetes and Nutrition Scheduling

## 2018-04-05 NOTE — MR AVS SNAPSHOT
"              After Visit Summary   2018    Vitor Osman    MRN: [id number]           Patient Information     Date Of Birth          1990        Visit Information        Provider Department      2018 1:30 PM NL RN [facility]        Today's Diagnoses     Screening examination for pulmonary tuberculosis    -  1       Follow-ups after your visit        Who to contact     If you have questions or need follow up information about today's clinic visit or your schedule please contact [facility] directly at [phone].  Normal or non-critical lab and imaging results will be communicated to you by MyChart, letter or phone within 4 business days after the clinic has received the results. If you do not hear from us within 7 days, please contact the clinic through RocketOzhart or phone. If you have a critical or abnormal lab result, we will notify you by phone as soon as possible.  Submit refill requests through Cognotion or call your pharmacy and they will forward the refill request to us. Please allow 3 business days for your refill to be completed.          Additional Information About Your Visit        MyChart Information     Cognotion lets you send messages to your doctor, view your test results, renew your prescriptions, schedule appointments and more. To sign up, go to [email or web address] . Click on \"Log in\" on the left side of the screen, which will take you to the Welcome page. Then click on \"Sign up Now\" on the right side of the page.     You will be asked to enter the access code listed below, as well as some personal information. Please follow the directions to create your username and password.     Your access code is: [email or web address]  Expires: 6/10/2018  9:17 AM     Your access code will  in 90 days. If you need help or a new code, please call your [facility] or [phone].        Care EveryWhere ID     This is your Care EveryWhere ID. This could be used " by other organizations to access your [facility] medical records  [id number]         Blood Pressure from Last 3 Encounters:   03/28/18 104/74   06/08/17 122/78   05/30/17 142/89    Weight from Last 3 Encounters:   03/28/18 182 lb 3.2 oz (82.6 kg)   06/08/17 189 lb (85.7 kg)   05/30/17 192 lb (87.1 kg)              Today, you had the following     No orders found for display       Primary Care Provider Office Phone # Fax #    Lee Collins [phone]       [address] [place] [address]        Equal Access to Services     NATALIYA GUERRERO : Hadvinayak Mccray, wayoselinda kendra, qaybta kaalmada yvan, verónica smith. So [facility] [phone].    ATENCIÓN: Si habla español, tiene a rodney disposición servicios gratuitos de asistencia lingüística. Llame al [phone].    We comply with applicable federal civil rights laws and [place] laws. We do not discriminate on the basis of race, color, national origin, age, disability, sex, sexual orientation, or gender identity.            Thank you!     Thank you for choosing [facility]  for your care. Our goal is always to provide you with excellent care. Hearing back from our patients is one way we can continue to improve our services. Please take a few minutes to complete the written survey that you may receive in the mail after your visit with us. Thank you!             Your Updated Medication List - Protect others around you: Learn how to safely use, store and throw away your medicines at www.disposemymeds.org.          This list is accurate as of 4/5/18  1:56 PM.  Always use your most recent med list.                   Brand Name Dispense Instructions for use Diagnosis    atorvastatin 10 MG tablet    LIPITOR    30 tablet    Take 1 tablet (10 mg) by mouth daily    Diabetes mellitus without complication (H)       fluticasone 50 MCG/ACT spray    FLONASE    16 g    Spray 1-2 sprays into both  nostrils daily    Chronic rhinitis       glyBURIDE-metFORMIN 1. MG per tablet    GLUCOVANCE    60 tablet    Take 1 tablet by mouth 2 times daily (with meals)    Diabetes mellitus without complication (H)       loratadine 10 MG tablet    CLARITIN    30 tablet    Take 1 tablet (10 mg) by mouth daily    Dysfunction of Eustachian tube, bilateral       order for DME     1 Device    Equipment being ordered: glucose monitor and related supplies for testing blood sugar daily    Diabetes mellitus without complication (H)       propranolol 20 MG tablet    INDERAL     Take 20 mg by mouth daily        sertraline 50 MG tablet    ZOLOFT    90 tablet    Take 100 mg by mouth daily        traZODone 50 MG tablet    DESYREL    30 tablet    Take 1 tablet (50 mg) by mouth nightly as needed for sleep

## 2018-04-05 NOTE — PROGRESS NOTES
Vitor Osman is here for Mantoux reading.   It was placed on her/his right forearm on 04/03/2018 day at 1pm time.    They are in the 48-72 hour window.    Mantoux was read by Andrea Meyers RN, BSN    Form filled out as needed    No further questions at this time.    Andrea Meyers RN, BSN

## 2018-04-06 ENCOUNTER — TELEPHONE (OUTPATIENT)
Dept: FAMILY MEDICINE | Facility: OTHER | Age: 28
End: 2018-04-06

## 2018-05-21 ENCOUNTER — OFFICE VISIT (OUTPATIENT)
Dept: URGENT CARE | Facility: RETAIL CLINIC | Age: 28
End: 2018-05-21
Payer: MEDICARE

## 2018-05-21 VITALS
HEART RATE: 72 BPM | SYSTOLIC BLOOD PRESSURE: 140 MMHG | OXYGEN SATURATION: 99 % | DIASTOLIC BLOOD PRESSURE: 87 MMHG | TEMPERATURE: 96.4 F

## 2018-05-21 DIAGNOSIS — R05.9 COUGH: ICD-10-CM

## 2018-05-21 DIAGNOSIS — Z72.0 TOBACCO ABUSE DISORDER: ICD-10-CM

## 2018-05-21 DIAGNOSIS — R05.8 PRODUCTIVE COUGH: Primary | ICD-10-CM

## 2018-05-21 PROCEDURE — 99203 OFFICE O/P NEW LOW 30 MIN: CPT | Performed by: PHYSICIAN ASSISTANT

## 2018-05-21 RX ORDER — DOXYCYCLINE HYCLATE 100 MG
100 TABLET ORAL 2 TIMES DAILY
Qty: 20 TABLET | Refills: 0 | Status: SHIPPED | OUTPATIENT
Start: 2018-05-21

## 2018-05-21 RX ORDER — BACITRACIN ZINC 500 [USP'U]/G
OINTMENT TOPICAL 2 TIMES DAILY
COMMUNITY

## 2018-05-21 NOTE — PATIENT INSTRUCTIONS
Please FOLLOW UP at primary care clinic if not improving, new symptoms, worse or this does not resolve.  [facility]  [phone]    Robitussin should help < cough at night.  > fluids.  Take antibiotic with food but not with dairy products.   Try to stop smoking!

## 2018-05-21 NOTE — MR AVS SNAPSHOT
"              After Visit Summary   2018    Vitor Osman    MRN: [id number]           Patient Information     Date Of Birth          1990        Visit Information        Provider Department      2018 2:40 PM Nola Gu PA-C [facility]        Today's Diagnoses     Cough    -  1    Tobacco abuse disorder        Productive cough          Care Instructions      Please FOLLOW UP at primary care clinic if not improving, new symptoms, worse or this does not resolve.  [facility]  [phone]    Robitussin should help < cough at night.  > fluids.  Take antibiotic with food but not with dairy products.   Try to stop smoking!              Follow-ups after your visit        Who to contact     You can reach your care team any time of the day by calling [phone].  Notification of test results:  If you have an abnormal lab result, we will notify you by phone as soon as possible.         Additional Information About Your Visit        MyChart Information     [employer] [facility] [employer] lets you send messages to your doctor, view your test results, renew your prescriptions, schedule appointments and more. To sign up, go to [email or web address] . Click on \"Log in\" on the left side of the screen, which will take you to the Welcome page. Then click on \"Sign up Now\" on the right side of the page.     You will be asked to enter the access code listed below, as well as some personal information. Please follow the directions to create your username and password.     Your access code is: [email or web address]  Expires: 6/10/2018  9:17 AM     Your access code will  in 90 days. If you need help or a new code, please call your [place] clinic or [phone].        Care EveryWhere ID     This is your Care EveryWhere ID. This could be used by other organizations to access your [place] medical records  [id number]        Your Vitals Were     Pulse Temperature Pulse Oximetry             72 96.4  F " (35.8  C) (Tympanic) 99%          Blood Pressure from Last 3 Encounters:   05/21/18 140/87   03/28/18 104/74   06/08/17 122/78    Weight from Last 3 Encounters:   03/28/18 182 lb 3.2 oz (82.6 kg)   06/08/17 189 lb (85.7 kg)   05/30/17 192 lb (87.1 kg)              Today, you had the following     No orders found for display         Today's Medication Changes          These changes are accurate as of 5/21/18  2:55 PM.  If you have any questions, ask your nurse or doctor.               Start taking these medicines.        Dose/Directions    doxycycline 100 MG tablet   Commonly known as:  VIBRA-TABS   Used for:  Productive cough   Started by:  Nola Gu PA-C        Dose:  100 mg   Take 1 tablet (100 mg) by mouth 2 times daily   Quantity:  20 tablet   Refills:  0            Where to get your medicines      Some of these will need a paper prescription and others can be bought over the counter.  Ask your nurse if you have questions.     Bring a paper prescription for each of these medications     doxycycline 100 MG tablet                Primary Care Provider Office Phone # Fax #    Lee Collins [phone]       [address] [address]        Equal Access to Services     NATALIYA GUERRERO AH: Billie dejesuso Soroshni, waaxda luqadaha, qaybta kaalmada adeegyada, verónica smith. So [facility] [phone].    ATENCIÓN: Si habla español, tiene a rodney disposición servicios gratuitos de asistencia lingüística. Llame al [phone].    We comply with applicable federal civil rights laws and [place] laws. We do not discriminate on the basis of race, color, national origin, age, disability, sex, sexual orientation, or gender identity.            Thank you!     Thank you for choosing [facility]  for your care. Our goal is always to provide you with excellent care. Hearing back from our patients is one way we can continue to improve our  services. Please take a few minutes to complete the written survey that you may receive in the mail after your visit with us. Thank you!             Your Updated Medication List - Protect others around you: Learn how to safely use, store and throw away your medicines at www.disposemymeds.org.          This list is accurate as of 5/21/18  2:55 PM.  Always use your most recent med list.                   Brand Name Dispense Instructions for use Diagnosis    ACETAMINOPHEN PO      Take 1,000 mg by mouth every 6 hours as needed for pain        alcohol swab prep pads     100 each    Use to swab area of injection/speedy as directed.    Diabetes mellitus without complication (H)       atorvastatin 10 MG tablet    LIPITOR    30 tablet    Take 1 tablet (10 mg) by mouth daily    Diabetes mellitus without complication (H)       bacitracin ointment      Apply topically 2 times daily        blood glucose calibration solution    NO BRAND SPECIFIED    1 Bottle    To accompany: Blood Glucose Monitor Brands: per insurance.    Diabetes mellitus without complication (H)       blood glucose monitoring meter device kit    no brand specified    1 kit    Use to test blood sugar 2 times daily or as directed. Preferred blood glucose meter OR supplies to accompany: Blood Glucose Monitor Brands: per insurance.    Diabetes mellitus without complication (H)       blood glucose monitoring test strip    no brand specified    100 strip    Use to test blood sugar 2 times daily or as directed. To accompany: Blood Glucose Monitor Brands: per insurance.    Diabetes mellitus without complication (H)       doxycycline 100 MG tablet    VIBRA-TABS    20 tablet    Take 1 tablet (100 mg) by mouth 2 times daily    Productive cough       FLUOXETINE HCL PO      Take 20 mg by mouth        fluticasone 50 MCG/ACT spray    FLONASE    16 g    Spray 1-2 sprays into both nostrils daily    Chronic rhinitis       glyBURIDE-metFORMIN 1. MG per tablet    GLUCOVANCE     60 tablet    Take 1 tablet by mouth 2 times daily (with meals)    Diabetes mellitus without complication (H)       loratadine 10 MG tablet    CLARITIN    30 tablet    Take 1 tablet (10 mg) by mouth daily    Dysfunction of Eustachian tube, bilateral       order for DME     1 Device    Equipment being ordered: glucose monitor and related supplies for testing blood sugar daily    Diabetes mellitus without complication (H)       phenol 1.4 % Liqd spray    CHLORASEPTIC     Take 1 spray by mouth every hour as needed for sore throat        propranolol 20 MG tablet    INDERAL     Take 20 mg by mouth 2 times daily        sodium chloride 0.65 % nasal spray    OCEAN     Spray 2 sprays into both nostrils daily as needed for congestion        thin lancets    NO BRAND SPECIFIED    100 each    Use with lanceting device. To accompany: Blood Glucose Monitor Brands: per insurance.    Diabetes mellitus without complication (H)       traZODone 50 MG tablet    DESYREL    30 tablet    Take 1 tablet (50 mg) by mouth nightly as needed for sleep

## 2018-05-21 NOTE — PROGRESS NOTES
"  Chief Complaint   Patient presents with     Cough     productive cough x 2 weeks yellow mucus     Nasal Congestion     nasal congestion x 2 weeks          SUBJECTIVE:   Pt. presenting to [facility] -  with a chief complaint of cough x week..   See CC.  Cough is occ productive.No SOB or chest pain.   Hx of asthma no  Here with Yariel, staff.  Onset of symptoms gradual  Course of illness is worsening.    Severity moderate  Current and Associated symptoms: cough - productive and fatigue  Treatment measures tried include OTC Cough med.  Predisposing factors include tobacco use.  Last antibiotic none > year    Smoker yes    ROS:  Afebrile   Energy level is fair  ENT - denies ear pain, throat pain. No nasal congestion  CP - see above  GI- - appetite normal. No nausea, vomiting or diarrhea.   No bowel or bladder changes   MSK - no joint pain or swelling   Skin: No rashes    Past Medical History:   Diagnosis Date     ADHD (attention deficit hyperactivity disorder)      Cannabis dependence (H)      FAS (fetal alcohol syndrome)      Mild major depression (H)      Mild mental retardation      Past Surgical History:   Procedure Laterality Date     SURGICAL HISTORY OF -       Unspecified surgery in the \"groin\" as a child     Patient Active Problem List   Diagnosis     Cannabis dependence (H)     ADHD (attention deficit hyperactivity disorder)     Mild mental retardation     FAS (fetal alcohol syndrome)     Major depressive disorder, recurrent episode, mild (H)     Bilateral low back pain without sciatica     Hyperlipidemia LDL goal <100     Diabetes mellitus without complication (H)     Current Outpatient Prescriptions   Medication     alcohol swab prep pads     atorvastatin (LIPITOR) 10 MG tablet     blood glucose calibration (NO BRAND SPECIFIED) solution     blood glucose monitoring (NO BRAND SPECIFIED) meter device kit     blood glucose monitoring (NO BRAND SPECIFIED) test strip     fluticasone (FLONASE) 50 " MCG/ACT spray     glyBURIDE-metFORMIN (GLUCOVANCE) 1. MG per tablet     loratadine (CLARITIN) 10 MG tablet     order for DME     propranolol (INDERAL) 20 MG tablet     thin (NO BRAND SPECIFIED) lancets     traZODone (DESYREL) 50 MG tablet     No current facility-administered medications for this visit.        OBJECTIVE:  /87 (BP Location: Right arm, Patient Position: Chair, Cuff Size: Adult Regular)  Pulse 72  Temp 96.4  F (35.8  C) (Tympanic)  SpO2 99%    GENERAL APPEARANCE: cooperative, alert and no distress. Appears well hydrated.  EYES: conjunctiva clear  HENT: Rt ear canal  clear and TM normal   Lt ear canal clear and TM normal   Nose no congestion. no discharge  Mouth without ulcers or lesions. no erythema. no exudate.   NECK: supple, few small shoddy NT ant nodes. No  posterior nodes.  RESP: lungs  no rales or wheezes but scattered ronchi hunter. Breathing easily.  CV: regular rates and rhythm  ABDOMEN:  soft, nontender, no HSM or masses and bowel sounds normal   SKIN: no suspicious lesions or rashes  no tenderness to palpate over  sinus areas.      ASSESSMENT:     Cough  Tobacco abuse disorder  Productive cough      PLAN:  Symptomatic measures   Prescriptions as below. Discussed indications, dosing, side affects and adverse reactions of medications with  Patient and staff, Yariel villafana  Eat yogurt daily or take a probiotic supplement when on antibiotics.  OTC cough suppressant/expectorant discussed  Cool mist vaporizer.  Smoking discouraged    Stay in clean air environment.  > rest.  > fluids.  Contagiousness and hygiene discussed.  Fever and pain  control measures discussed.   If unable to swallow or any breathing difficulty to go to ED AVS given and discussed:  Patient Instructions     Please FOLLOW UP at primary care clinic if not improving, new symptoms, worse or this does not resolve.  [facility]  [phone]    Manuelitussin should help < cough at night.  > fluids.  Take  antibiotic with food but not with dairy products.   Try to stop smoking!      Forms completed  patient and staff are comfortable with this plan.  Electronically signed,  SEAN Gu, PAC

## 2018-05-30 NOTE — PROGRESS NOTES
"  SUBJECTIVE:   Vitor Osman is a 27 year old male who presents to clinic today for the following health issues:      HPI  Back Pain       Duration: Always Just off and on        Specific cause: Patient states he has scoliosis    Description:   Location of pain: middle of back Centered  Character of pain: Just hurts  Pain radiation:none  New numbness or weakness in legs, not attributed to pain:  no     Intensity: Currently 7/10    History:   Pain interferes with job: Not applicable  History of back problems: recurrent self limited episodes of low back pain in the past  Any previous MRI or X-rays: None  Sees a specialist for back pain:  No  Therapies tried without relief: acetaminophen (Tylenol) and NSAIDs    Alleviating factors:   Improved by: none      Precipitating factors:  Worsened by: Coughing    Functional and Psychosocial Screen (Gena STarT Back):      Not performed today          Accompanying Signs & Symptoms:  Risk of Fracture:  None  Risk of Cauda Equina:  None  Risk of Infection:  None  Risk of Cancer:  None  Risk of Ankylosing Spondylitis:  Onset at age <35, male, AND morning back stiffness. no      Problem list and histories reviewed & adjusted, as indicated.  Additional history: as documented    Patient Active Problem List   Diagnosis     Cannabis dependence (H)     ADHD (attention deficit hyperactivity disorder)     Mild mental retardation     FAS (fetal alcohol syndrome)     Major depressive disorder, recurrent episode, mild (H)     Bilateral low back pain without sciatica     Hyperlipidemia LDL goal <100     Diabetes mellitus without complication (H)     Past Surgical History:   Procedure Laterality Date     SURGICAL HISTORY OF -       Unspecified surgery in the \"groin\" as a child       Social History   Substance Use Topics     Smoking status: Current Every Day Smoker     Packs/day: 1.00     Last attempt to quit: 8/3/2013     Smokeless tobacco: Never Used     Alcohol use No     History reviewed. " No pertinent family history.      Current Outpatient Prescriptions   Medication Sig Dispense Refill     ACETAMINOPHEN PO Take 1,000 mg by mouth every 6 hours as needed for pain       alcohol swab prep pads Use to swab area of injection/speedy as directed. 100 each 0     atorvastatin (LIPITOR) 10 MG tablet Take 1 tablet (10 mg) by mouth daily 30 tablet 0     bacitracin ointment Apply topically 2 times daily       blood glucose (NO BRAND SPECIFIED) lancets standard Use to test blood sugar 1 times daily 100 each 1     blood glucose (NO BRAND SPECIFIED) lancing device Use to test blood sugars 1 times daily 1 each 0     blood glucose calibration (NO BRAND SPECIFIED) solution To accompany: Blood Glucose Monitor Brands: per insurance. 1 Bottle 1     blood glucose monitoring (NO BRAND SPECIFIED) meter device kit Use to test blood sugar 2 times daily or as directed. Preferred blood glucose meter OR supplies to accompany: Blood Glucose Monitor Brands: per insurance. 1 kit 0     blood glucose monitoring (NO BRAND SPECIFIED) meter device kit Use to test blood sugar 1 times daily 1 kit 0     blood glucose monitoring (NO BRAND SPECIFIED) test strip Use to test blood sugars 1 times daily 100 strip 1     blood glucose monitoring (NO BRAND SPECIFIED) test strip Use to test blood sugar 2 times daily or as directed. To accompany: Blood Glucose Monitor Brands: per insurance. 100 strip 1     doxycycline (VIBRA-TABS) 100 MG tablet Take 1 tablet (100 mg) by mouth 2 times daily 20 tablet 0     FLUOXETINE HCL PO Take 20 mg by mouth       fluticasone (FLONASE) 50 MCG/ACT spray Spray 1-2 sprays into both nostrils daily (Patient not taking: Reported on 5/21/2018) 16 g 1     glyBURIDE-metFORMIN (GLUCOVANCE) 1. MG per tablet Take 1 tablet by mouth 2 times daily (with meals) 60 tablet 0     loratadine (CLARITIN) 10 MG tablet Take 1 tablet (10 mg) by mouth daily (Patient not taking: Reported on 5/21/2018) 30 tablet 11     order for DME  Equipment being ordered: glucose monitor and related supplies for testing blood sugar daily 1 Device 0     phenol (CHLORASEPTIC) 1.4 % LIQD spray Take 1 spray by mouth every hour as needed for sore throat       propranolol (INDERAL) 20 MG tablet Take 20 mg by mouth 2 times daily   1     sodium chloride (OCEAN) 0.65 % nasal spray Spray 2 sprays into both nostrils daily as needed for congestion       thin (NO BRAND SPECIFIED) lancets Use with lanceting device. To accompany: Blood Glucose Monitor Brands: per insurance. 100 each 3     traZODone (DESYREL) 50 MG tablet Take 1 tablet (50 mg) by mouth nightly as needed for sleep 30 tablet 1     No Known Allergies  Recent Labs   Lab Test  04/03/18   1244  04/03/18   1243  05/30/17   2200  05/03/14   1038   A1C  13.7*   --    --    --    LDL   --   117*   --   118   HDL   --   33*   --   34*   TRIG   --   260*   --   151*   ALT   --   26   --    --    CR   --   0.81  0.76  1.14   GFRESTIMATED   --   >90  >90  Non African American GFR Calc    78   GFRESTBLACK   --   >90  >90  African American GFR Calc    >90   POTASSIUM   --   4.4  4.4  4.7   TSH   --   1.36   --   2.73      BP Readings from Last 3 Encounters:   06/01/18 138/80   05/21/18 140/87   03/28/18 104/74    Wt Readings from Last 3 Encounters:   06/01/18 184 lb 11.2 oz (83.8 kg)   03/28/18 182 lb 3.2 oz (82.6 kg)   06/08/17 189 lb (85.7 kg)                  Labs reviewed in EPIC    ROS:  Constitutional, HEENT, cardiovascular, pulmonary, gi and gu systems are negative, except as otherwise noted.    OBJECTIVE:     /80 (Cuff Size: Adult Large)  Pulse 72  Temp 98.6  F (37  C) (Temporal)  Resp 18  Wt 184 lb 11.2 oz (83.8 kg)  BMI 30.85 kg/m2  Body mass index is 30.85 kg/(m^2).  GENERAL: healthy, alert and no distress  NECK: no adenopathy, no asymmetry, masses, or scars and trachea midline and normal to palpation  RESP: lungs clear to auscultation - no rales, rhonchi or wheezes  CV: regular rate and rhythm, normal  S1 S2, no S3 or S4, no murmur, click or rub, no peripheral edema and peripheral pulses strong  ABDOMEN: soft, nontender, no hepatosplenomegaly, no masses and bowel sounds normal  MS: no gross musculoskeletal defects noted, no edema  SKIN: no suspicious lesions or rashes to visible skin  Comprehensive back pain exam:  Tenderness of Low thoracic upper lumbar spine today., Range of motion not limited by pain, Lower extremity strength functional and equal on both sides, Lower extremity reflexes within normal limits bilaterally and Lower extremity sensation normal and equal on both sides  PSYCH: mentation appears normal, affect normal/bright    Diagnostic Test Results:  none   X-ray: Questionable for pathology related to the junction between thoracic and lumbar spine.  I question whether or not he has a disc degenerative condition in this region today to my eye.  It will be overread by radiology.    ASSESSMENT/PLAN:     1. Diabetes mellitus without complication (H)  Improvement noted.  Would have him continue current medications and recheck in 12 weeks.  - *UA reflex to Microscopic and Culture ([facility] (except [place] and [facility])  - blood glucose monitoring (NO BRAND SPECIFIED) meter device kit; Use to test blood sugar 2 times daily or as directed. Preferred blood glucose meter OR supplies to accompany: Blood Glucose Monitor Brands: per insurance.  Dispense: 1 kit; Refill: 0  - Hemoglobin A1c  - LDL cholesterol direct  - blood glucose monitoring (NO BRAND SPECIFIED) test strip; Use to test blood sugars 1 times daily  Dispense: 100 strip; Refill: 1  - blood glucose monitoring (NO BRAND SPECIFIED) meter device kit; Use to test blood sugar 1 times daily  Dispense: 1 kit; Refill: 0  - blood glucose (NO BRAND SPECIFIED) lancets standard; Use to test blood sugar 1 times daily  Dispense: 100 each; Refill: 1  - blood glucose (NO BRAND SPECIFIED) lancing device; Use to test blood sugars 1 times daily   Dispense: 1 each; Refill: 0    2. Chronic bilateral low back pain without sciatica  4. Chronic midline thoracic back pain  Consult requested.  - SPINE SURGERY REFERRAL  - XR Thoracic Spine 2 Views; Future  - PHYSICAL THERAPY REFERRAL    3. Hyperlipidemia LDL goal <100  - Hemoglobin A1c  - LDL cholesterol direct    Once again I reiterated the fact that I would not be prescribing narcotic analgesic medications for this patient.  He will he be seen by physical therapy and spine specialist for any further considerations.    Josué Vivas PA-C  [facility] [place] for HPI/ROS submitted by the patient on 6/1/2018   If you checked off any problems, how difficult have these problems made it for you to do your work, take care of things at home, or get along with other people?: Not difficult at all  PHQ9 TOTAL SCORE: 2  JOSE LUIS 7 TOTAL SCORE: Incomplete

## 2018-06-01 ENCOUNTER — RADIANT APPOINTMENT (OUTPATIENT)
Dept: GENERAL RADIOLOGY | Facility: OTHER | Age: 28
End: 2018-06-01
Attending: PHYSICIAN ASSISTANT
Payer: MEDICARE

## 2018-06-01 ENCOUNTER — OFFICE VISIT (OUTPATIENT)
Dept: FAMILY MEDICINE | Facility: OTHER | Age: 28
End: 2018-06-01
Payer: MEDICARE

## 2018-06-01 VITALS
WEIGHT: 184.7 LBS | RESPIRATION RATE: 18 BRPM | SYSTOLIC BLOOD PRESSURE: 138 MMHG | HEART RATE: 72 BPM | TEMPERATURE: 98.6 F | BODY MASS INDEX: 30.85 KG/M2 | DIASTOLIC BLOOD PRESSURE: 80 MMHG

## 2018-06-01 DIAGNOSIS — M54.50 CHRONIC BILATERAL LOW BACK PAIN WITHOUT SCIATICA: ICD-10-CM

## 2018-06-01 DIAGNOSIS — E11.9 DIABETES MELLITUS WITHOUT COMPLICATION (H): ICD-10-CM

## 2018-06-01 DIAGNOSIS — M54.6 CHRONIC MIDLINE THORACIC BACK PAIN: ICD-10-CM

## 2018-06-01 DIAGNOSIS — G89.29 CHRONIC BILATERAL LOW BACK PAIN WITHOUT SCIATICA: ICD-10-CM

## 2018-06-01 DIAGNOSIS — G89.29 CHRONIC BILATERAL LOW BACK PAIN WITHOUT SCIATICA: Primary | ICD-10-CM

## 2018-06-01 DIAGNOSIS — G89.29 CHRONIC MIDLINE THORACIC BACK PAIN: ICD-10-CM

## 2018-06-01 DIAGNOSIS — E78.5 HYPERLIPIDEMIA LDL GOAL <100: ICD-10-CM

## 2018-06-01 DIAGNOSIS — M54.50 CHRONIC BILATERAL LOW BACK PAIN WITHOUT SCIATICA: Primary | ICD-10-CM

## 2018-06-01 LAB
ALBUMIN UR-MCNC: NEGATIVE MG/DL
APPEARANCE UR: CLEAR
BILIRUB UR QL STRIP: NEGATIVE
COLOR UR AUTO: YELLOW
GLUCOSE UR STRIP-MCNC: 100 MG/DL
HBA1C MFR BLD: 9.1 % (ref 0–5.6)
HGB UR QL STRIP: NEGATIVE
KETONES UR STRIP-MCNC: NEGATIVE MG/DL
LDLC SERPL DIRECT ASSAY-MCNC: 55 MG/DL
LEUKOCYTE ESTERASE UR QL STRIP: NEGATIVE
NITRATE UR QL: NEGATIVE
PH UR STRIP: 6.5 PH (ref 5–7)
SOURCE: ABNORMAL
SP GR UR STRIP: 1.01 (ref 1–1.03)
UROBILINOGEN UR STRIP-ACNC: 1 EU/DL (ref 0.2–1)

## 2018-06-01 PROCEDURE — 83721 ASSAY OF BLOOD LIPOPROTEIN: CPT | Performed by: PHYSICIAN ASSISTANT

## 2018-06-01 PROCEDURE — 81003 URINALYSIS AUTO W/O SCOPE: CPT | Performed by: PHYSICIAN ASSISTANT

## 2018-06-01 PROCEDURE — 99214 OFFICE O/P EST MOD 30 MIN: CPT | Performed by: PHYSICIAN ASSISTANT

## 2018-06-01 PROCEDURE — 72070 X-RAY EXAM THORAC SPINE 2VWS: CPT | Mod: FY

## 2018-06-01 PROCEDURE — 36415 COLL VENOUS BLD VENIPUNCTURE: CPT | Performed by: PHYSICIAN ASSISTANT

## 2018-06-01 PROCEDURE — 83036 HEMOGLOBIN GLYCOSYLATED A1C: CPT | Performed by: PHYSICIAN ASSISTANT

## 2018-06-01 RX ORDER — LANCING DEVICE
EACH MISCELLANEOUS
Qty: 1 EACH | Refills: 0 | Status: SHIPPED | OUTPATIENT
Start: 2018-06-01

## 2018-06-01 ASSESSMENT — PATIENT HEALTH QUESTIONNAIRE - PHQ9
SUM OF ALL RESPONSES TO PHQ QUESTIONS 1-9: 2
10. IF YOU CHECKED OFF ANY PROBLEMS, HOW DIFFICULT HAVE THESE PROBLEMS MADE IT FOR YOU TO DO YOUR WORK, TAKE CARE OF THINGS AT HOME, OR GET ALONG WITH OTHER PEOPLE: NOT DIFFICULT AT ALL
SUM OF ALL RESPONSES TO PHQ QUESTIONS 1-9: 2

## 2018-06-01 ASSESSMENT — ANXIETY QUESTIONNAIRES: GAD7 TOTAL SCORE: INCOMPLETE

## 2018-06-01 ASSESSMENT — PAIN SCALES - GENERAL: PAINLEVEL: SEVERE PAIN (7)

## 2018-06-01 NOTE — MR AVS SNAPSHOT
"              After Visit Summary   6/1/2018    Vitor Osman    MRN: [id number]           Patient Information     Date Of Birth          1990        Visit Information        Provider Department      6/1/2018 1:20 PM Josué Turner PA-C [facility]'s Diagnoses     Chronic bilateral low back pain without sciatica    -  1    Diabetes mellitus without complication (H)        Hyperlipidemia LDL goal <100        Chronic midline thoracic back pain           Follow-ups after your visit        Additional Services     PHYSICAL THERAPY REFERRAL       *This therapy referral will be filtered to a centralized scheduling office at [facility] and the patient will receive a call to schedule an appointment at a [place] location most convenient for them. *     [facility] provides Physical Therapy evaluation and treatment and many specialty services across the [place] system.  If requesting a specialty program, please choose from the list below.    If you have not heard from the scheduling office within 2 business days, please call [phone] for all locations, with the exception of [place], please call [phone] and [facility], please call [phone]  Treatment: Evaluation & Treatment  Special Instructions/Modalities: mid back pain consult  Special Programs: as needed based on assessment    Please be aware that coverage of these services is subject to the terms and limitations of your health insurance plan.  Call member services at your health plan with any benefit or coverage questions.      **Note to Provider:  If you are referring outside of [place] for the therapy appointment, please list the name of the location in the \"special instructions\" above, print the referral and give to the patient to schedule the appointment.            SPINE SURGERY REFERRAL       Please choose Medical Spine Specialist (unless patient was seen by a Medical Spine " Specialist within the past 6 months).  Surgical Evaluation is advised if the patient presents with one or more of the following red flags:     **Cauda Equina Syndrome  **Evidence of Spinal Tumor  **Fracture  **Infection  **Loss of Bowel or Bladder Control  **Sudden or Progressive Weakness  **Any other documented emergent neurological condition resulting from a Lumbar Spinal Condition.    You have been referred to: Spine Lumbar: Medical Spine Specialist: [facility]: [facility] [phone]  [email or web address]    Evaluate low back pain options in light of medication restrictions due to history.    Please be aware that coverage of these services is subject to the terms and limitations of your health insurance plan.  Call member services at your health plan with any benefit or coverage questions.      Please bring the following to your appointment:    **Any x-rays, CTs or MRIs which have been performed.  Contact the facility where they were done to arrange for  prior to your scheduled appointment.    **List of current medications   **This referral request   **Any documents/labs given to you regarding this referral                  Who to contact     If you have questions or need follow up information about today's clinic visit or your schedule please contact [facility] directly at [phone].  Normal or non-critical lab and imaging results will be communicated to you by MyChart, letter or phone within 4 business days after the clinic has received the results. If you do not hear from us within 7 days, please contact the clinic through MyChart or phone. If you have a critical or abnormal lab result, we will notify you by phone as soon as possible.  Submit refill requests through [employer] or call your pharmacy and they  will forward the refill request to us. Please allow 3 business days for your refill to be completed.          Additional Information About Your Visit        Care EveryWhere ID     This is your Care EveryWhere ID. This could be used by other organizations to access your [place] medical records  [id number]        Your Vitals Were     Pulse Temperature Respirations BMI (Body Mass Index)          72 98.6  F (37  C) (Temporal) 18 30.85 kg/m2         Blood Pressure from Last 3 Encounters:   06/01/18 138/80   05/21/18 140/87   03/28/18 104/74    Weight from Last 3 Encounters:   06/01/18 184 lb 11.2 oz (83.8 kg)   03/28/18 182 lb 3.2 oz (82.6 kg)   06/08/17 189 lb (85.7 kg)              We Performed the Following     *UA reflex to Microscopic and Culture ([place] and [place] [facility] (except [place] and [facility])     Hemoglobin A1c     LDL cholesterol direct     PHYSICAL THERAPY REFERRAL     SPINE SURGERY REFERRAL          Today's Medication Changes          These changes are accurate as of 6/1/18  2:33 PM.  If you have any questions, ask your nurse or doctor.               Start taking these medicines.        Dose/Directions    blood glucose lancets standard   Commonly known as:  no brand specified   Used for:  Diabetes mellitus without complication (H)   Started by:  Josué Turner PA-C        Use to test blood sugar 1 times daily   Quantity:  100 each   Refills:  1       blood glucose lancing device   Commonly known as:  no brand specified   Used for:  Diabetes mellitus without complication (H)   Started by:  Josué Turner PA-C        Use to test blood sugars 1 times daily   Quantity:  1 each   Refills:  0         These medicines have changed or have updated prescriptions.        Dose/Directions    * blood glucose monitoring meter device kit   Commonly known as:  no brand specified   This may have changed:  Another medication with the same name was added. Make sure you understand how and when to take each.   Used  for:  Diabetes mellitus without complication (H)   Changed by:  Josué Turner PA-C        Use to test blood sugar 2 times daily or as directed. Preferred blood glucose meter OR supplies to accompany: Blood Glucose Monitor Brands: per insurance.   Quantity:  1 kit   Refills:  0       * blood glucose monitoring meter device kit   Commonly known as:  no brand specified   This may have changed:  You were already taking a medication with the same name, and this prescription was added. Make sure you understand how and when to take each.   Used for:  Diabetes mellitus without complication (H)   Changed by:  Josué Turner PA-C        Use to test blood sugar 1 times daily   Quantity:  1 kit   Refills:  0       * blood glucose monitoring test strip   Commonly known as:  no brand specified   This may have changed:  Another medication with the same name was added. Make sure you understand how and when to take each.   Used for:  Diabetes mellitus without complication (H)   Changed by:  Josué Turner PA-C        Use to test blood sugar 2 times daily or as directed. To accompany: Blood Glucose Monitor Brands: per insurance.   Quantity:  100 strip   Refills:  1       * blood glucose monitoring test strip   Commonly known as:  no brand specified   This may have changed:  You were already taking a medication with the same name, and this prescription was added. Make sure you understand how and when to take each.   Used for:  Diabetes mellitus without complication (H)   Changed by:  Josué Turner PA-C        Use to test blood sugars 1 times daily   Quantity:  100 strip   Refills:  1       * Notice:  This list has 4 medication(s) that are the same as other medications prescribed for you. Read the directions carefully, and ask your doctor or other care provider to review them with you.         Where to get your medicines      These medications were sent to [facility] [place] [address]   [address]  [place] [address]     Phone:  [phone]     blood glucose lancets standard    blood glucose lancing device    blood glucose monitoring meter device kit    blood glucose monitoring test strip         Some of these will need a paper prescription and others can be bought over the counter.  Ask your nurse if you have questions.     Bring a paper prescription for each of these medications     blood glucose monitoring meter device kit                Primary Care Provider Office Phone # Fax #    Lee Collins [phone]       [address] [place] [address]        Equal Access to Services     [facility]: Hadii aad ku hadasho Soomaali, waaxda luqadaha, qaybta kaalmada adeegyada, waxgladys wiley hayhaydern lele adkins . So [facility] [phone].    ATENCIÓN: Si habla español, tiene a rodney disposición servicios gratuitos de asistencia lingüística. [facility] [phone].    We comply with applicable federal civil rights laws and [place] laws. We do not discriminate on the basis of race, color, national origin, age, disability, sex, sexual orientation, or gender identity.            Thank you!     Thank you for choosing [facility]  for your care. Our goal is always to provide you with excellent care. Hearing back from our patients is one way we can continue to improve our services. Please take a few minutes to complete the written survey that you may receive in the mail after your visit with us. Thank you!             Your Updated Medication List - Protect others around you: Learn how to safely use, store and throw away your medicines at www.disposemymeds.org.          This list is accurate as of 6/1/18  2:33 PM.  Always use your most recent med list.                   Brand Name Dispense Instructions for use Diagnosis    ACETAMINOPHEN PO      Take 1,000 mg by mouth every 6 hours as needed for pain        alcohol swab prep pads     100 each    Use to swab area of  injection/speedy as directed.    Diabetes mellitus without complication (H)       atorvastatin 10 MG tablet    LIPITOR    30 tablet    Take 1 tablet (10 mg) by mouth daily    Diabetes mellitus without complication (H)       bacitracin ointment      Apply topically 2 times daily        blood glucose calibration solution    NO BRAND SPECIFIED    1 Bottle    To accompany: Blood Glucose Monitor Brands: per insurance.    Diabetes mellitus without complication (H)       blood glucose lancets standard    no brand specified    100 each    Use to test blood sugar 1 times daily    Diabetes mellitus without complication (H)       blood glucose lancing device    no brand specified    1 each    Use to test blood sugars 1 times daily    Diabetes mellitus without complication (H)       * blood glucose monitoring meter device kit    no brand specified    1 kit    Use to test blood sugar 2 times daily or as directed. Preferred blood glucose meter OR supplies to accompany: Blood Glucose Monitor Brands: per insurance.    Diabetes mellitus without complication (H)       * blood glucose monitoring meter device kit    no brand specified    1 kit    Use to test blood sugar 1 times daily    Diabetes mellitus without complication (H)       * blood glucose monitoring test strip    no brand specified    100 strip    Use to test blood sugar 2 times daily or as directed. To accompany: Blood Glucose Monitor Brands: per insurance.    Diabetes mellitus without complication (H)       * blood glucose monitoring test strip    no brand specified    100 strip    Use to test blood sugars 1 times daily    Diabetes mellitus without complication (H)       doxycycline 100 MG tablet    VIBRA-TABS    20 tablet    Take 1 tablet (100 mg) by mouth 2 times daily    Productive cough       FLUOXETINE HCL PO      Take 20 mg by mouth        fluticasone 50 MCG/ACT spray    FLONASE    16 g    Spray 1-2 sprays into both nostrils daily    Chronic rhinitis        glyBURIDE-metFORMIN 1. MG per tablet    GLUCOVANCE    60 tablet    Take 1 tablet by mouth 2 times daily (with meals)    Diabetes mellitus without complication (H)       loratadine 10 MG tablet    CLARITIN    30 tablet    Take 1 tablet (10 mg) by mouth daily    Dysfunction of Eustachian tube, bilateral       order for DME     1 Device    Equipment being ordered: glucose monitor and related supplies for testing blood sugar daily    Diabetes mellitus without complication (H)       phenol 1.4 % Liqd spray    CHLORASEPTIC     Take 1 spray by mouth every hour as needed for sore throat        propranolol 20 MG tablet    INDERAL     Take 20 mg by mouth 2 times daily        sodium chloride 0.65 % nasal spray    OCEAN     Spray 2 sprays into both nostrils daily as needed for congestion        thin lancets    NO BRAND SPECIFIED    100 each    Use with lanceting device. To accompany: Blood Glucose Monitor Brands: per insurance.    Diabetes mellitus without complication (H)       traZODone 50 MG tablet    DESYREL    30 tablet    Take 1 tablet (50 mg) by mouth nightly as needed for sleep        * Notice:  This list has 4 medication(s) that are the same as other medications prescribed for you. Read the directions carefully, and ask your doctor or other care provider to review them with you.

## 2018-06-02 ASSESSMENT — PATIENT HEALTH QUESTIONNAIRE - PHQ9: SUM OF ALL RESPONSES TO PHQ QUESTIONS 1-9: 2

## 2018-06-04 ENCOUNTER — TELEPHONE (OUTPATIENT)
Dept: FAMILY MEDICINE | Facility: OTHER | Age: 28
End: 2018-06-04

## 2018-06-04 DIAGNOSIS — E11.9 DIABETES MELLITUS WITHOUT COMPLICATION (H): ICD-10-CM

## 2018-06-04 RX ORDER — GLYBURIDE-METFORMIN HYDROCHLORIDE 1.25; 25 MG/1; MG/1
2 TABLET ORAL 2 TIMES DAILY WITH MEALS
Qty: 360 TABLET | Refills: 1 | Status: SHIPPED | OUTPATIENT
Start: 2018-06-04

## 2018-06-04 NOTE — TELEPHONE ENCOUNTER
Spoke to Caron the nurse at the facility patient is staying. Informed of message below. They will start patient on medication as requested.  Medication is pending will route to provider.   Amanda Christian CMA ([facility])    Notes Recorded by Josué Turner PA-C on 6/3/2018 at 8:07 PM  Improvement is noted and treatment should be an increase in your Glucovance.  You should take 2 pills twice a day and recheck in 3 months.  Please let us know if you are willing to do so so that we can update your prescriptions.  Electronically signed:    Josué Turner PA-C

## 2018-06-15 ENCOUNTER — HOSPITAL ENCOUNTER (OUTPATIENT)
Dept: PHYSICAL THERAPY | Facility: OTHER | Age: 28
Setting detail: THERAPIES SERIES
End: 2018-06-15
Attending: PHYSICIAN ASSISTANT
Payer: MEDICARE

## 2018-06-15 PROCEDURE — 97161 PT EVAL LOW COMPLEX 20 MIN: CPT | Mod: GP | Performed by: PHYSICAL THERAPIST

## 2018-06-15 PROCEDURE — G8990 OTHER PT/OT CURRENT STATUS: HCPCS | Mod: GP,CI | Performed by: PHYSICAL THERAPIST

## 2018-06-15 PROCEDURE — 97110 THERAPEUTIC EXERCISES: CPT | Mod: GP | Performed by: PHYSICAL THERAPIST

## 2018-06-15 PROCEDURE — G8991 OTHER PT/OT GOAL STATUS: HCPCS | Mod: GP,CI | Performed by: PHYSICAL THERAPIST

## 2018-06-15 PROCEDURE — 40000718 ZZHC STATISTIC PT DEPARTMENT ORTHO VISIT: Performed by: PHYSICAL THERAPIST

## 2018-06-15 PROCEDURE — 97112 NEUROMUSCULAR REEDUCATION: CPT | Mod: GP | Performed by: PHYSICAL THERAPIST

## 2018-06-15 NOTE — PROGRESS NOTES
06/15/18 1300   General Information   Type of Visit Initial OP Ortho PT Evaluation   Start of Care Date 06/15/18   Referring Physician KRISHAN Carvalho   Patient/Family Goals Statement no goals stated, less pain, do sports   Orders Evaluate and Treat   Date of Order 06/01/18   Insurance Type Other   Insurance Comments/Visits Authorized [employer]   Medical Diagnosis chronic midline thoracic back pain, chronic bilateral low back pain without sciatica   Body Part(s)   Body Part(s) Cervical Spine;Lumbar Spine/SI   Presentation and Etiology   Pertinent history of current problem (include personal factors and/or comorbidities that impact the POC) Pt recalls having low back pain since he was young so about 15 years or so and was told a few years ago that he has scoliosis. Recent thoracic x ray showed: IMPRESSION: Broad-based dextroconvex curvature mid to lower thoracic. Takes IBP or Tylenol for pain but it varies how often it is needed, not able to give specifics.    Impairments A. Pain   Functional Limitations perform activities of daily living;perform desired leisure / sports activities   Symptom Location lower thoracic to lumbar region   How/Where did it occur From insidious onset   Onset date of current episode/exacerbation 6/1/18 is referral date but pain X 15 years   Chronicity Chronic   Pain rating (0-10 point scale) Best (/10);Worst (/10)   Best (/10) average pain 4/10   Worst (/10) 8/10   Pain quality A. Sharp;B. Dull;C. Aching   Frequency of pain/symptoms B. Intermittent  (30% of day)   Pain/symptoms are: The same all the time   Pain/symptoms exacerbated by A. Sitting;G. Certain positions;M. Other   Pain exacerbation comment not quite sure, standing, it varies, jumping, twisting, some sports but just ignores it   Pain/symptoms eased by C. Rest;B. Walking;K. Other   Pain eased by comment lying down   Progression of symptoms since onset: Unchanged   Prior Level of Function   Functional Level Prior Comment no concerns    Current Level of Function   Patient role/employment history H. Other   Living environment Other  (pt in a crisis home)   Current equipment-Gait/Locomotion None   Fall Risk Screen   Fall screen completed by PT   Have you fallen 2 or more times in the past year? No   Have you fallen and had an injury in the past year? No   Is patient a fall risk? No   System Outcome Measures   Outcome Measures Low Back Pain (see Oswestry and Gena)   Functional Scales   Functional Scales Other   Other Scales  pt still at normal functional level, just works throught the pain, pt does not wake due to pain   Lumbar Spine/SI Objective Findings   Observation no pain behaviors at rest   Posture poor sitting posture and fair standing posture   Gait/Locomotion normal   Flexion ROM wnl, R rotation of thoracic spine with flexion in standing   Extension ROM wnl   Hip Flexion (L2) Strength 5/5   Hip Abduction Strength 5/5   Hip Adduction Strength 5/5   Hip Extension Strength 5/5   Knee Flexion Strength 5/5   Knee Extension (L3) Strength 5/5   Ankle Dorsiflexion (L4) Strength 5/5   Lumbar/Hip/Knee/Foot Strength Comments prone plank X 1' or more, prone superman X 1 minute or more without pain   Lumbar/SI Special Tests Comments did mechanical lumbar exam for directional preference using static/dynamic for directional preference. In standing pt had no sx. Flexion in standing X 1 no change, 2X10 no sx. Extension in standing X 1 no change, X 10 no change. Hooklying no sx. Flexion in lying X 1 no change, 2X10 no change. Prone lying no sx. Prone on elbows no sx. Prone extension X 1 1/10 during, 0/10 after, no change. Extension 2X10 0/10 sx after.    Planned Therapy Interventions   Planned Therapy Interventions ROM;strengthening;neuromuscular re-education   Clinical Impression   Criteria for Skilled Therapeutic Interventions Met yes, treatment indicated   PT Diagnosis mechanical low back pain, chronic   Influenced by the following impairments pain    Functional limitations due to impairments hobbies/sports   Clinical Presentation Stable/Uncomplicated   Clinical Presentation Rationale low Gena, SHERICE 11%   Clinical Decision Making (Complexity) Low complexity   Therapy Frequency other (see comments)   Predicted Duration of Therapy Intervention (days/wks) 3 visits over 2 months, decrease as able   Risk & Benefits of therapy have been explained Yes   Patient, Family & other staff in agreement with plan of care Yes   Education Assessment   Preferred Learning Style Listening   Barriers to Learning No barriers   ORTHO GOALS   PT Ortho Eval Goals 1;2   Ortho Goal 1   Goal Identifier home program   Goal Description instruct pt in home program for keeping back strong and flexible so pt can do his sports   Target Date 06/15/18   Date Met 06/15/18   Ortho Goal 2   Goal Identifier function   Goal Description pt will note a reduction in overall back sx as measured by pt SHERICE score decrease by 50% or better   Target Date 07/15/18   Total Evaluation Time   Total Evaluation Time 35

## 2018-06-25 NOTE — PROGRESS NOTES
[facility]          OUTPATIENT PHYSICAL THERAPY ORTHOPEDIC EVALUATION  PLAN OF TREATMENT FOR OUTPATIENT REHABILITATION  (COMPLETE FOR INITIAL CLAIMS ONLY)  Patient's Last Name, First Name, M.I.  YOB: 1990  DawsonVitor       Provider s Name:  [facility]   Medical Record No.  [id number]   Start of Care Date:  06/15/18   Onset Date:   6/1/2018  Order date, but pain x 15 years   Type:     _X__PT   ___OT   ___SLP Medical Diagnosis:  chronic midline thoracic back pain, chronic bilateral low back pain without sciatica     PT Diagnosis:  mechanical low back pain, chronic   Visits from SOC:  1      _________________________________________________________________________________  Plan of Treatment/Functional Goals:  ROM, strengthening, neuromuscular re-education           Goals  Goal Identifier: home program  Goal Description: instruct pt in home program for keeping back strong and flexible so pt can do his sports  Target Date: 06/15/18    Goal Identifier: function  Goal Description: pt will note a reduction in overall back sx as measured by pt SHERICE score decrease by 50% or better  Target Date: 07/15/18       Therapy Frequency:  other (see comments)  Predicted Duration of Therapy Intervention:  3 visits over 2 months, decrease as able    ALINA العلي, PT                 I CERTIFY THE NEED FOR THESE SERVICES FURNISHED UNDER        THIS PLAN OF TREATMENT AND WHILE UNDER MY CARE     (Physician co-signature of this document indicates review and certification of the therapy plan).                       Certification Date From:  06/15/18   Certification Date To:  08/15/18    Referring Provider:  KRISHAN Carvalho    Initial Assessment        See Epic Evaluation Start of Care Date: 06/15/18

## 2018-06-25 NOTE — ADDENDUM NOTE
Encounter addended by: Valerio Leblanc, PT on: 6/25/2018 12:03 PM<BR>     Actions taken: Sign clinical note, Document created, Charge Capture section accepted, Flowsheet accepted

## 2018-07-03 NOTE — ADDENDUM NOTE
Encounter addended by: Valerio Leblanc, PT on: 7/3/2018  4:38 PM<BR>     Actions taken: Sign clinical note

## 2018-07-16 NOTE — ADDENDUM NOTE
Encounter addended by: Valerio Leblanc, PT on: 7/16/2018  1:33 PM<BR>     Actions taken: Sign clinical note, Document created

## 2018-08-13 NOTE — PROGRESS NOTES
Outpatient Physical Therapy Discharge Note     Patient: Vitor Osman  : 1990    Beginning/End Dates of Reporting Period:  6/15/18 to 2018 (pt seen for 6/15/18 visit and no show on 18)    Referring Provider: KRISHAN Carvalho Diagnosis: mechanical low back pain, chronic     Client Self Report: see evaluation    Objective Measurements:  See evaluation for details    Outcome Measures (most recent score):  Gena STarT Sub-Score (Q5-9): 0  Gena STarT Total Score (all 9): 0  Oswestry Score (%): 11.11 %    Goals:  Goal Identifier home program   Goal Description instruct pt in home program for keeping back strong and flexible so pt can do his sports   Target Date 06/15/18   Date Met  06/15/18   Progress:     Goal Identifier function   Goal Description pt will note a reduction in overall back sx as measured by pt SHERICE score decrease by 50% or better   Target Date 07/15/18   Date Met      Progress:       Progress Toward Goals:   Progress this reporting period: goal 1 met. Pt no show for 18 visit so goal 2 not applicable.     Plan:  Discharge from therapy.    Discharge:    Reason for Discharge: Patient chooses to discontinue therapy.  Patient has failed to schedule further appointments.  PT has not been notified of any problems or questions since last visit.    Equipment Issued: none    Discharge Plan: Patient to continue home program.

## 2018-08-13 NOTE — ADDENDUM NOTE
Encounter addended by: Valerio Leblanc, PT on: 8/13/2018 11:16 AM<BR>     Actions taken: Sign clinical note, Episode resolved

## 2018-09-13 ENCOUNTER — TELEPHONE (OUTPATIENT)
Dept: FAMILY MEDICINE | Facility: OTHER | Age: 28
End: 2018-09-13

## 2018-09-13 NOTE — TELEPHONE ENCOUNTER
Summary:    Patient is due/failing the following:   Diabetic follow up and A1C    Action needed:   Patient needs office visit for follow up.    Type of outreach:    Sent letter.    Questions for provider review:    None                                                                                                                                    Kat Reno       Chart routed to Care Team .          Panel Management Review      Patient has the following on his problem list:     Depression / Dysthymia review    Measure:  Needs PHQ-9 score of 4 or less during index window.  Administer PHQ-9 and if score is 5 or more, send encounter to provider for next steps.    PHQ-9 SCORE 5/18/2017 3/28/2018 6/1/2018   Total Score - - -   Total Score MyChart - 6 (Mild depression) 2 (Minimal depression)   Total Score 10 6 2       If PHQ-9 recheck is 5 or more, route to provider for next steps.    Patient is due for:  PHQ9    Diabetes    ASA: Passed    Last A1C  Lab Results   Component Value Date    A1C 9.1 06/01/2018    A1C 13.7 04/03/2018     A1C tested: FAILED    Last LDL:    Lab Results   Component Value Date    CHOL 202 04/03/2018     Lab Results   Component Value Date    HDL 33 04/03/2018     Lab Results   Component Value Date    LDL 55 06/01/2018     04/03/2018     Lab Results   Component Value Date    TRIG 260 04/03/2018     Lab Results   Component Value Date    CHOLHDLRATIO 5.4 05/03/2014     Lab Results   Component Value Date    NHDL 169 04/03/2018       Is the patient on a Statin? YES             Is the patient on Aspirin? NO    Medications     HMG CoA Reductase Inhibitors    atorvastatin (LIPITOR) 10 MG tablet          Last three blood pressure readings:  BP Readings from Last 3 Encounters:   06/01/18 138/80   05/21/18 140/87   03/28/18 104/74            Tobacco History:     History   Smoking Status     Current Every Day Smoker     Packs/day: 1.00     Last attempt to quit: 8/3/2013   Smokeless Tobacco      Never Used           Composite cancer screening  Chart review shows that this patient is due/due soon for the following None

## 2018-09-13 NOTE — LETTER
[facility]  [address] [facility] [address]  Phone: [phone]  September 13, 2018      iVtor Osman  [address] [facility] [address]      Dear Vitor,    We care about your health and have reviewed your health plan including your medical conditions, medications, and lab results.  Based on this review, it is recommended that you follow up regarding the following health topic(s):  -Diabetes    We recommend you take the following action(s):  -schedule a FOLLOWUP OFFICE APPOINTMENT.  We will perform the following labs:  A1c.     Please call us at the [facility] - [phone] (or use [employer]) to address the above recommendations.     Thank you for trusting [facility] and we appreciate the opportunity to serve you.  We look forward to supporting your healthcare needs in the future.    Healthy Regards,    Your Health Care Team  [facility]

## 2018-10-01 DIAGNOSIS — E11.9 DIABETES MELLITUS WITHOUT COMPLICATION (H): ICD-10-CM

## 2018-10-01 NOTE — LETTER
Vitor Osman  [address] [address] [place] [id number]    October 3, 2018      Dear Vitor Osman    APPOINTMENT REMINDER:   Our records indicates that it is time for you to be seen for a recheck.     Your current medication request will not be approved for refills you will need to be seen before refills can be approved.  Taking care of your health is important to us, and ongoing visits with your provider are vital to your care.    We look forward to seeing you in the near future.  You may call our office at [phone] to schedule a visit.     Please disregard this notice if you have already made an appointment.      Sincerely,    [facility]     [facility]  [address] [facility] [address]  Phone: [phone]

## 2018-10-02 NOTE — TELEPHONE ENCOUNTER
Atorvastatin    Routing refill request to provider for review/approval because:  A break in medication    Angela Lan RN, BSN

## 2018-10-03 DIAGNOSIS — E11.9 DIABETES MELLITUS WITHOUT COMPLICATION (H): ICD-10-CM

## 2018-10-03 RX ORDER — ATORVASTATIN CALCIUM 10 MG/1
TABLET, FILM COATED ORAL
Qty: 30 TABLET | OUTPATIENT
Start: 2018-10-03

## 2018-10-03 NOTE — TELEPHONE ENCOUNTER
Attempted to contact patient, phone number on file is incorrect number  Letter sent   Closing encounter  Ayanna Bennett RT (R)

## 2018-10-03 NOTE — TELEPHONE ENCOUNTER
No further refills without office visit.  Diabetes no in control and many HM concerns that need to be addressed.  Please have him schedule follow-up with PCP of choice.  PCP listed is not related to the [facility].  Electronically signed:    Josué Vivas PA-C

## 2018-10-04 RX ORDER — ATORVASTATIN CALCIUM 10 MG/1
TABLET, FILM COATED ORAL
Qty: 30 TABLET | Refills: 5 | Status: SHIPPED | OUTPATIENT
Start: 2018-10-04

## 2018-10-04 NOTE — TELEPHONE ENCOUNTER
"Requested Prescriptions   Pending Prescriptions Disp Refills     atorvastatin (LIPITOR) 10 MG tablet [Pharmacy Med Name: ATORVASTATIN 10MG TAB] 30 tablet      Sig: TAKE 1 TABLET (10 MG) BY MOUTH DAILY    Statins Protocol Passed    10/3/2018  9:16 AM       Passed - LDL on file in past 12 months    Recent Labs   Lab Test  06/01/18   1434   LDL  55          Passed - No abnormal creatine kinase in past 12 months    No lab results found.          Passed - Recent (12 mo) or future (30 days) visit within the authorizing provider's specialty    Patient had office visit in the last 12 months or has a visit in the next 30 days with authorizing provider or within the authorizing provider's specialty.  See \"Patient Info\" tab in inbasket, or \"Choose Columns\" in Meds & Orders section of the refill encounter.           Passed - Patient is age 18 or older      atorvastatin (LIPITOR) 10 MG tablet  Prescription approved per [facility] [place].    Anupama An RN, BSN     "